# Patient Record
Sex: FEMALE | Race: WHITE | NOT HISPANIC OR LATINO
[De-identification: names, ages, dates, MRNs, and addresses within clinical notes are randomized per-mention and may not be internally consistent; named-entity substitution may affect disease eponyms.]

---

## 2019-02-04 ENCOUNTER — HOSPITAL (OUTPATIENT)
Dept: OTHER | Age: 49
End: 2019-02-04
Attending: OBSTETRICS & GYNECOLOGY

## 2021-04-26 ENCOUNTER — LAB ENCOUNTER (OUTPATIENT)
Dept: LAB | Age: 51
End: 2021-04-26
Attending: STUDENT IN AN ORGANIZED HEALTH CARE EDUCATION/TRAINING PROGRAM
Payer: COMMERCIAL

## 2021-04-26 DIAGNOSIS — Z00.00 WELL ADULT EXAM: ICD-10-CM

## 2021-04-26 DIAGNOSIS — R45.86 MOOD CHANGE: ICD-10-CM

## 2021-04-26 PROCEDURE — 86431 RHEUMATOID FACTOR QUANT: CPT

## 2021-04-26 PROCEDURE — 82306 VITAMIN D 25 HYDROXY: CPT

## 2021-04-26 PROCEDURE — 82728 ASSAY OF FERRITIN: CPT

## 2021-04-26 PROCEDURE — 86038 ANTINUCLEAR ANTIBODIES: CPT

## 2021-04-26 PROCEDURE — 83540 ASSAY OF IRON: CPT

## 2021-04-26 PROCEDURE — 84466 ASSAY OF TRANSFERRIN: CPT

## 2021-04-26 PROCEDURE — 85025 COMPLETE CBC W/AUTO DIFF WBC: CPT

## 2021-04-26 PROCEDURE — 36415 COLL VENOUS BLD VENIPUNCTURE: CPT

## 2021-04-26 PROCEDURE — 80061 LIPID PANEL: CPT

## 2021-04-26 PROCEDURE — 85652 RBC SED RATE AUTOMATED: CPT

## 2021-04-26 PROCEDURE — 84443 ASSAY THYROID STIM HORMONE: CPT

## 2021-04-26 PROCEDURE — 81001 URINALYSIS AUTO W/SCOPE: CPT | Performed by: STUDENT IN AN ORGANIZED HEALTH CARE EDUCATION/TRAINING PROGRAM

## 2021-04-26 PROCEDURE — 83036 HEMOGLOBIN GLYCOSYLATED A1C: CPT

## 2021-04-26 PROCEDURE — 80053 COMPREHEN METABOLIC PANEL: CPT

## 2021-04-26 PROCEDURE — 83721 ASSAY OF BLOOD LIPOPROTEIN: CPT

## 2021-04-26 PROCEDURE — 84207 ASSAY OF VITAMIN B-6: CPT | Performed by: STUDENT IN AN ORGANIZED HEALTH CARE EDUCATION/TRAINING PROGRAM

## 2021-04-26 PROCEDURE — 82607 VITAMIN B-12: CPT

## 2021-04-26 NOTE — PROGRESS NOTES
HPI:    Patient ID: Rehan Burdick is a 44year old female. HPI  Pt presenting to [facility] care. She states she has not felt well over the last few years since being really sick 3 years ago following an airline flight.  She complains of fatigue, mood changes SHORTNESS OF BREATH, SWELLING  Shellfish Allergy       ANAPHYLAXIS, SWELLING    04/26/21  1135   BP: 108/68   Pulse: 69   Resp: 18   SpO2: 95%   Weight: 162 lb (73.5 kg)   Height: 5' 3\" (1.6 m)       Body mass index is 28.7 kg/m².    PHYSICAL EXAM:   Physi ARTHRITIS FACTOR; Future  - SED RATE, SARAH (AUTOMATED); Future  - VITAMIN B6  - VITAMIN B12; Future    2. Fatigue, unspecified type  See above    3. Food allergy  - to see Allergy for formal allergy testing  - ALLERGY - INTERNAL    4.  Well adult exam

## 2021-04-28 ENCOUNTER — TELEPHONE (OUTPATIENT)
Dept: FAMILY MEDICINE CLINIC | Facility: CLINIC | Age: 51
End: 2021-04-28

## 2021-04-28 NOTE — TELEPHONE ENCOUNTER
Patient has viewed 4/26/2021 results in [address] and has questions. Patient made aware Dr. Deyanira Chavez not in office until [facility] testing still in process.     \"I can wait for the tests to be final, but I would prefer, when the tests are final, for h

## 2021-04-30 ENCOUNTER — TELEPHONE (OUTPATIENT)
Dept: FAMILY MEDICINE CLINIC | Facility: CLINIC | Age: 51
End: 2021-04-30

## 2021-04-30 NOTE — TELEPHONE ENCOUNTER
Patient confirms has reviewed lab results, reviewed medications that were sent to pharmacy, patient agreed with plan. Patient wanted to confirm recommendation if ok for her to move forward with getting a Covid vaccine, please advise.

## 2021-05-01 PROBLEM — E55.9 VITAMIN D DEFICIENCY: Status: ACTIVE | Noted: 2021-05-01

## 2021-05-01 PROBLEM — R16.0 ENLARGED LIVER: Status: ACTIVE | Noted: 2019-11-08

## 2021-05-01 PROBLEM — I95.9 HYPOTENSION: Status: ACTIVE | Noted: 2019-11-03

## 2021-05-01 PROBLEM — D25.9 LEIOMYOMA OF UTERUS, UNSPECIFIED: Status: ACTIVE | Noted: 2021-05-01

## 2021-05-01 PROBLEM — E04.9 ENLARGED THYROID: Status: ACTIVE | Noted: 2019-11-03

## 2021-05-01 PROBLEM — E22.0 ACROMEGALY (HCC): Status: ACTIVE | Noted: 2019-11-14

## 2021-05-01 NOTE — TELEPHONE ENCOUNTER
I recommend that any patient who has the option to get the COVID vaccine to proceed with immunization for protection against COVID infection and possible long-haul chronic symptoms.  Benefit of vaccine greatly outweighs rare risk of side effect, so she marianne

## 2021-05-03 NOTE — TELEPHONE ENCOUNTER
Relayed DR. Moore's recommendations  Patient verbalized understanding and agreed to plan of care  Patient was given phone number to schedule COVID vaccine

## 2021-05-07 ENCOUNTER — OFFICE VISIT (OUTPATIENT)
Dept: FAMILY MEDICINE CLINIC | Facility: CLINIC | Age: 51
End: 2021-05-07
Payer: COMMERCIAL

## 2021-05-07 ENCOUNTER — TELEPHONE (OUTPATIENT)
Dept: INTERNAL MEDICINE CLINIC | Facility: CLINIC | Age: 51
End: 2021-05-07

## 2021-05-07 VITALS
RESPIRATION RATE: 16 BRPM | HEIGHT: 63 IN | SYSTOLIC BLOOD PRESSURE: 120 MMHG | DIASTOLIC BLOOD PRESSURE: 68 MMHG | BODY MASS INDEX: 28.35 KG/M2 | WEIGHT: 160 LBS | HEART RATE: 74 BPM | TEMPERATURE: 99 F

## 2021-05-07 DIAGNOSIS — R68.83 CHILLS WITHOUT FEVER: Primary | ICD-10-CM

## 2021-05-07 DIAGNOSIS — R53.83 FATIGUE, UNSPECIFIED TYPE: ICD-10-CM

## 2021-05-07 DIAGNOSIS — R68.83 CHILLS (WITHOUT FEVER): Primary | ICD-10-CM

## 2021-05-07 PROCEDURE — 99213 OFFICE O/P EST LOW 20 MIN: CPT | Performed by: PHYSICIAN ASSISTANT

## 2021-05-07 PROCEDURE — 81003 URINALYSIS AUTO W/O SCOPE: CPT | Performed by: PHYSICIAN ASSISTANT

## 2021-05-07 PROCEDURE — 3008F BODY MASS INDEX DOCD: CPT | Performed by: PHYSICIAN ASSISTANT

## 2021-05-07 PROCEDURE — 3078F DIAST BP <80 MM HG: CPT | Performed by: PHYSICIAN ASSISTANT

## 2021-05-07 PROCEDURE — 3074F SYST BP LT 130 MM HG: CPT | Performed by: PHYSICIAN ASSISTANT

## 2021-05-07 PROCEDURE — 87086 URINE CULTURE/COLONY COUNT: CPT | Performed by: PHYSICIAN ASSISTANT

## 2021-05-07 NOTE — PROGRESS NOTES
CHIEF COMPLAINT:   Patient presents with:  Covid-19 Test: chills/fatigue since 5/6      HPI:   Meek Staley is a 44year old female who presents with symptoms mildly concerning for COVID19-- chills without fever and fatigue.  She was scheduled for 1st dose of well, well nourished, in no apparent distress  SKIN: no rashes,no suspicious lesions  HEAD: atraumatic, normocephalic.    EYES: conjunctiva clear  EARS: TM's non injected, no bulging, noretraction,no fluid, bony landmarks visible  NOSE: Nostrils patent, no self quarantine at home while awaiting result. CDC quarantine recommendations reviewed. Advised a negative COVID test does not guarantee pt is not infectious or contagious. Notify employer/school of testing.      Advised to follow up with PCP, noti somehow got respiratory droplets on you    Reducing the length of quarantine may make it easier for people to quarantine by reducing the time they cannot work.  A shorter quarantine period also can lessen stress on the public health system, especially when your hands often with soap and water for at least 20 seconds or clean your hands with an alcohol-based hand  that contains at least 60% alcohol. 8. As much as possible, stay in a specific room and away from other people in your home.  Also, you s have a fever with cough or shortness of breath but have not been exposed to someone with COVID-19 and have not tested positive for COVID-19, you should also stay home and away from others for a total of 10 days after your symptoms started, and at least 24 convalescent plasma donors must:    · Have had a confirmed diagnosis of COVID-19  · Be symptom-free for at least 14 days*    *Some people will be required to have a repeat COVID-19 test in order to be eligible to donate.  If you’re instructed by Leona nuñez factors. How do I prevent PASC?   The best way to prevent the long-term symptoms of COVID-19 is by getting the COVID 19 vaccine as soon as it is available to you, wear a mask in public, avoid large gatherings, wash your hands frequently, and stay at least

## 2021-05-07 NOTE — TELEPHONE ENCOUNTER
Pt states she was scheduled for covid vaccine today and informed them she has chills and fatigue. Per pt \"they told me I should be tested for covid\"    Pt denies any further symptoms such as cough, fever, SOB, diarrhea, nausea, wheezing, weakness.

## 2021-05-07 NOTE — PATIENT INSTRUCTIONS
Coronavirus Disease 2019 (COVID-19)     [facility] is committed to the safety and well-being of our patients, members, employees, and communities.  As concerns arise about the new strain of coronavirus that causes COVID-19, [employer] exposure  • After day 7 from date of last exposure with a negative test result (test must occur on day 5 or later)  After stopping quarantine, you should  • Watch for symptoms until 14 days after exposure.   • If you have symptoms, immediately self-isolate Care     If you are awaiting test results or are confirmed positive for COVID -19, and your symptoms worsen at home with symptoms such as: extreme weakness, difficult breathing, or unrelenting fevers greater than 100.4 degrees Fahrenheit, you should contac Follow-up  If you are diagnosed with COVID, refrain from exercise until approved by your primary care provider. Please call your primary care provider within 2 days of your discharge to arrange for a telehealth follow-up.  CDC does not recommend repeat test Control & Prevention (CDC)  10 things you can do to manage your health at home, Marvel.nl. pdf  Tour Desk.[email or web address].au Coronavirus \"long-hauler\". (2021, January 28). Retrieved March 17, 2021, from [email or web address]-it-means-ey-dm-d-coronavirus-long-hauler/

## 2021-05-19 ENCOUNTER — IMMUNIZATION (OUTPATIENT)
Dept: LAB | Facility: HOSPITAL | Age: 51
End: 2021-05-19
Attending: EMERGENCY MEDICINE
Payer: COMMERCIAL

## 2021-05-19 DIAGNOSIS — Z23 NEED FOR VACCINATION: Primary | ICD-10-CM

## 2021-05-19 PROCEDURE — 0001A SARSCOV2 VAC 30MCG/0.3ML IM: CPT

## 2021-06-01 ENCOUNTER — TELEPHONE (OUTPATIENT)
Dept: FAMILY MEDICINE CLINIC | Facility: CLINIC | Age: 51
End: 2021-06-01

## 2021-06-01 NOTE — TELEPHONE ENCOUNTER
Patient was seen in office on 4/26/2021 to establish care. Per patient at that visit she and Dr. Isela Duque discussed possibly starting patient on a mood stabilizer/ depression medication. Per patient, she would like to do so.  She is asking that  se

## 2021-06-02 NOTE — TELEPHONE ENCOUNTER
Verified pt name and . Reviewed doctor's message as noted below. Pt states she will keep in-office appt as scheduled 21.

## 2021-06-02 NOTE — TELEPHONE ENCOUNTER
Would recommend virtual visit to discuss current symptoms and treatment options. She does not need to come into the office, but at least virtual visit please.

## 2021-06-04 ENCOUNTER — LAB ENCOUNTER (OUTPATIENT)
Dept: LAB | Age: 51
End: 2021-06-04
Attending: STUDENT IN AN ORGANIZED HEALTH CARE EDUCATION/TRAINING PROGRAM
Payer: COMMERCIAL

## 2021-06-04 ENCOUNTER — OFFICE VISIT (OUTPATIENT)
Dept: FAMILY MEDICINE CLINIC | Facility: CLINIC | Age: 51
End: 2021-06-04
Payer: COMMERCIAL

## 2021-06-04 VITALS
HEIGHT: 63 IN | HEART RATE: 79 BPM | WEIGHT: 160 LBS | DIASTOLIC BLOOD PRESSURE: 67 MMHG | TEMPERATURE: 98 F | SYSTOLIC BLOOD PRESSURE: 109 MMHG | BODY MASS INDEX: 28.35 KG/M2

## 2021-06-04 DIAGNOSIS — R31.9 HEMATURIA, UNSPECIFIED TYPE: ICD-10-CM

## 2021-06-04 DIAGNOSIS — Z91.018 FOOD ALLERGY: ICD-10-CM

## 2021-06-04 DIAGNOSIS — R10.11 COLICKY RUQ ABDOMINAL PAIN: ICD-10-CM

## 2021-06-04 DIAGNOSIS — R35.0 URINARY FREQUENCY: ICD-10-CM

## 2021-06-04 DIAGNOSIS — R19.4 CHANGE IN STOOL HABITS: ICD-10-CM

## 2021-06-04 DIAGNOSIS — R43.2 LOSS OF TASTE: ICD-10-CM

## 2021-06-04 DIAGNOSIS — R43.2 LOSS OF TASTE: Primary | ICD-10-CM

## 2021-06-04 PROCEDURE — 3078F DIAST BP <80 MM HG: CPT | Performed by: STUDENT IN AN ORGANIZED HEALTH CARE EDUCATION/TRAINING PROGRAM

## 2021-06-04 PROCEDURE — 36415 COLL VENOUS BLD VENIPUNCTURE: CPT

## 2021-06-04 PROCEDURE — 99214 OFFICE O/P EST MOD 30 MIN: CPT | Performed by: STUDENT IN AN ORGANIZED HEALTH CARE EDUCATION/TRAINING PROGRAM

## 2021-06-04 PROCEDURE — 86003 ALLG SPEC IGE CRUDE XTRC EA: CPT

## 2021-06-04 PROCEDURE — 3074F SYST BP LT 130 MM HG: CPT | Performed by: STUDENT IN AN ORGANIZED HEALTH CARE EDUCATION/TRAINING PROGRAM

## 2021-06-04 PROCEDURE — 86769 SARS-COV-2 COVID-19 ANTIBODY: CPT

## 2021-06-04 PROCEDURE — 81002 URINALYSIS NONAUTO W/O SCOPE: CPT | Performed by: STUDENT IN AN ORGANIZED HEALTH CARE EDUCATION/TRAINING PROGRAM

## 2021-06-04 PROCEDURE — 3008F BODY MASS INDEX DOCD: CPT | Performed by: STUDENT IN AN ORGANIZED HEALTH CARE EDUCATION/TRAINING PROGRAM

## 2021-06-04 PROCEDURE — 82785 ASSAY OF IGE: CPT

## 2021-06-04 NOTE — PROGRESS NOTES
HPI:    Patient ID: Sanya Rogers is a 46year old female. HPI  Pt presenting for follow-up. She states that something has changes since she does not feel right. She reports change in taste associated with mouth irritation on inner lower lip.  She reports de (1.6 m)       Body mass index is 28.34 kg/m². PHYSICAL EXAM:   Physical Exam  Vitals reviewed. Constitutional:       General: She is not in acute distress. Appearance: Normal appearance. She is well-developed.    HENT:      Head: Normocephalic and a hydration  - GASTRO - INTERNAL  - US ABDOMEN COMPLETE (CPT=76700); Future    4. Colicky RUQ abdominal pain  - US ABDOMEN COMPLETE (CPT=76700); Future    5.  Urinary frequency  POC UA blood, glucose  - will check urine micro and UCx  - increase hydration and

## 2021-06-05 ENCOUNTER — TELEPHONE (OUTPATIENT)
Dept: FAMILY MEDICINE CLINIC | Facility: CLINIC | Age: 51
End: 2021-06-05

## 2021-06-05 NOTE — TELEPHONE ENCOUNTER
Dr Margaret Overton lab unable to add on labs. Does pt need to be fasting for redraw? Or need to add anything else?

## 2021-06-05 NOTE — TELEPHONE ENCOUNTER
----- Message from Tita Quick MD sent at 6/5/2021  3:02 AM CDT -----  Please call lab about add-on CPK, CMP orders.

## 2021-06-07 ENCOUNTER — LAB ENCOUNTER (OUTPATIENT)
Dept: LAB | Age: 51
End: 2021-06-07
Attending: STUDENT IN AN ORGANIZED HEALTH CARE EDUCATION/TRAINING PROGRAM
Payer: COMMERCIAL

## 2021-06-07 DIAGNOSIS — R31.9 HEMATURIA, UNSPECIFIED TYPE: ICD-10-CM

## 2021-06-07 PROCEDURE — 82550 ASSAY OF CK (CPK): CPT

## 2021-06-07 PROCEDURE — 36415 COLL VENOUS BLD VENIPUNCTURE: CPT

## 2021-06-07 PROCEDURE — 80053 COMPREHEN METABOLIC PANEL: CPT

## 2021-06-07 NOTE — TELEPHONE ENCOUNTER
Called pt to inform that she needs to come back for another blood draw. Pt verbalized understanding.

## 2021-06-09 ENCOUNTER — TELEPHONE (OUTPATIENT)
Dept: FAMILY MEDICINE CLINIC | Facility: CLINIC | Age: 51
End: 2021-06-09

## 2021-06-09 NOTE — TELEPHONE ENCOUNTER
Spoke with patient ( verified) and relayed Dr. Rudi Whyte messages below--patient verbalizes understanding and agreement.      Patient asking for assistance with sooner appt than 2021 with Dr. Ezra Alfaro, if possible--sent to Allergy Clinical staff for as

## 2021-06-09 NOTE — TELEPHONE ENCOUNTER
allergy appointment  Message [phone]  From   Shirley Ly RN To   Sathish Dejesus and Delivered   6/9/2021  9:28 AM   Last Read in [email or web address]   6/9/2021 11:00 AM by Adriana Chandra,     I just left you a message to relay the Allergy nurse, Juan R Gill

## 2021-06-09 NOTE — TELEPHONE ENCOUNTER
Hi,   I am so sorry but we do not have any consult that is sooner than what the patient already has scheduled. We are booking into the third week of July at this time. This is our busiest time of the year, and we only have one provider in office.  She has b

## 2021-06-28 ENCOUNTER — NURSE ONLY (OUTPATIENT)
Dept: ALLERGY | Facility: CLINIC | Age: 51
End: 2021-06-28
Payer: COMMERCIAL

## 2021-06-28 ENCOUNTER — OFFICE VISIT (OUTPATIENT)
Dept: ALLERGY | Facility: CLINIC | Age: 51
End: 2021-06-28
Payer: COMMERCIAL

## 2021-06-28 VITALS
RESPIRATION RATE: 18 BRPM | SYSTOLIC BLOOD PRESSURE: 114 MMHG | OXYGEN SATURATION: 95 % | WEIGHT: 160 LBS | BODY MASS INDEX: 28.35 KG/M2 | HEIGHT: 63 IN | DIASTOLIC BLOOD PRESSURE: 74 MMHG | HEART RATE: 83 BPM

## 2021-06-28 DIAGNOSIS — J30.89 NON-SEASONAL ALLERGIC RHINITIS DUE TO OTHER ALLERGIC TRIGGER: ICD-10-CM

## 2021-06-28 DIAGNOSIS — Z91.018 FOOD ALLERGY: Primary | ICD-10-CM

## 2021-06-28 DIAGNOSIS — F17.200 SMOKER: ICD-10-CM

## 2021-06-28 DIAGNOSIS — Z91.018 FOOD ALLERGY: ICD-10-CM

## 2021-06-28 DIAGNOSIS — Z88.0 PENICILLIN ALLERGY: ICD-10-CM

## 2021-06-28 PROCEDURE — 3078F DIAST BP <80 MM HG: CPT | Performed by: ALLERGY & IMMUNOLOGY

## 2021-06-28 PROCEDURE — 95004 PERQ TESTS W/ALRGNC XTRCS: CPT | Performed by: ALLERGY & IMMUNOLOGY

## 2021-06-28 PROCEDURE — 3074F SYST BP LT 130 MM HG: CPT | Performed by: ALLERGY & IMMUNOLOGY

## 2021-06-28 PROCEDURE — 99204 OFFICE O/P NEW MOD 45 MIN: CPT | Performed by: ALLERGY & IMMUNOLOGY

## 2021-06-28 PROCEDURE — 3008F BODY MASS INDEX DOCD: CPT | Performed by: ALLERGY & IMMUNOLOGY

## 2021-06-28 RX ORDER — CHLORAL HYDRATE 500 MG
1000 CAPSULE ORAL DAILY
COMMUNITY

## 2021-06-28 NOTE — PATIENT INSTRUCTIONS
1. Food allergy   patient reports a known history of shellfish allergy. Patient defers retesting at this time and move avoidance. Patient defers EpiPen at this time  Patient concern for other potential food triggers.   See skin testing to common food linh

## 2021-06-28 NOTE — PROGRESS NOTES
Raven Tomlin is a 46year old female. HPI:   Patient presents with: Allergies: Consult Miguel Schafer MD.  Pt presents with shellfish allergy, states she has not be feeling well. Having symtoms of mild rash, diarreha. Denies antihistamine use. Outpatient Medications   Medication Sig Dispense Refill   • omega-3 fatty acids 1000 MG Oral Cap Take 1,000 mg by mouth daily. • ergocalciferol 1.25 MG (31739 UT) Oral Cap Take 1 capsule (50,000 Units total) by mouth once a week.  24 capsule 0   • metFO adenopathy  Lymphatic: no abnormal cervical, supraclavicular or axillary adenopathy is noted  Respiratory: normal to inspection lungs are clear to auscultation bilaterally normal respiratory effort   Cardiovascular: regular rate and rhythm no murmurs, gall testing to penicillin if interested in pursuing    4. Reviewed smoking cessation         Orders This Visit:  No orders of the defined types were placed in this encounter.       Meds This Visit:  Requested Prescriptions      No prescriptions requested or or

## 2021-07-01 ENCOUNTER — HOSPITAL ENCOUNTER (OUTPATIENT)
Dept: ULTRASOUND IMAGING | Age: 51
Discharge: HOME | End: 2021-07-01
Attending: STUDENT IN AN ORGANIZED HEALTH CARE EDUCATION/TRAINING PROGRAM
Payer: COMMERCIAL

## 2021-07-01 DIAGNOSIS — R10.11 COLICKY RUQ ABDOMINAL PAIN: ICD-10-CM

## 2021-07-01 DIAGNOSIS — R19.4 CHANGE IN STOOL HABITS: ICD-10-CM

## 2021-07-06 ENCOUNTER — HOSPITAL ENCOUNTER (OUTPATIENT)
Dept: ULTRASOUND IMAGING | Facility: HOSPITAL | Age: 51
Discharge: HOME | End: 2021-07-06
Attending: STUDENT IN AN ORGANIZED HEALTH CARE EDUCATION/TRAINING PROGRAM
Payer: COMMERCIAL

## 2021-07-06 PROCEDURE — 76700 US EXAM ABDOM COMPLETE: CPT | Performed by: STUDENT IN AN ORGANIZED HEALTH CARE EDUCATION/TRAINING PROGRAM

## 2021-08-07 ENCOUNTER — HOSPITAL ENCOUNTER (EMERGENCY)
Facility: HOSPITAL | Age: 51
Discharge: HOME | End: 2021-08-07
Attending: EMERGENCY MEDICINE
Payer: COMMERCIAL

## 2021-08-07 ENCOUNTER — APPOINTMENT (OUTPATIENT)
Dept: CT IMAGING | Facility: HOSPITAL | Age: 51
End: 2021-08-07
Attending: EMERGENCY MEDICINE
Payer: COMMERCIAL

## 2021-08-07 ENCOUNTER — APPOINTMENT (OUTPATIENT)
Dept: GENERAL RADIOLOGY | Facility: HOSPITAL | Age: 51
End: 2021-08-07
Attending: EMERGENCY MEDICINE
Payer: COMMERCIAL

## 2021-08-07 VITALS
RESPIRATION RATE: 20 BRPM | BODY MASS INDEX: 27.64 KG/M2 | SYSTOLIC BLOOD PRESSURE: 121 MMHG | OXYGEN SATURATION: 95 % | HEIGHT: 63 IN | HEART RATE: 61 BPM | DIASTOLIC BLOOD PRESSURE: 70 MMHG | WEIGHT: 156 LBS | TEMPERATURE: 98 F

## 2021-08-07 DIAGNOSIS — R91.1 LUNG NODULE: ICD-10-CM

## 2021-08-07 DIAGNOSIS — K57.30 DIVERTICULOSIS OF COLON: ICD-10-CM

## 2021-08-07 DIAGNOSIS — R53.1 WEAKNESS GENERALIZED: ICD-10-CM

## 2021-08-07 DIAGNOSIS — R53.83 OTHER FATIGUE: Primary | ICD-10-CM

## 2021-08-07 LAB
ALBUMIN SERPL-MCNC: 3.8 G/DL (ref 3.4–5)
ALP LIVER SERPL-CCNC: 68 U/L
ALT SERPL-CCNC: 56 U/L
ANION GAP SERPL CALC-SCNC: 8 MMOL/L (ref 0–18)
AST SERPL-CCNC: 20 U/L (ref 15–37)
BASOPHILS # BLD AUTO: 0.02 X10(3) UL (ref 0–0.2)
BASOPHILS NFR BLD AUTO: 0.3 %
BILIRUB DIRECT SERPL-MCNC: <0.1 MG/DL (ref 0–0.2)
BILIRUB SERPL-MCNC: 0.3 MG/DL (ref 0.1–2)
BUN BLD-MCNC: 13 MG/DL (ref 7–18)
BUN/CREAT SERPL: 23.2 (ref 10–20)
CALCIUM BLD-MCNC: 9.3 MG/DL (ref 8.5–10.1)
CHLORIDE SERPL-SCNC: 112 MMOL/L (ref 98–112)
CO2 SERPL-SCNC: 22 MMOL/L (ref 21–32)
CREAT BLD-MCNC: 0.56 MG/DL
DEPRECATED RDW RBC AUTO: 42 FL (ref 35.1–46.3)
EOSINOPHIL # BLD AUTO: 0.06 X10(3) UL (ref 0–0.7)
EOSINOPHIL NFR BLD AUTO: 0.9 %
ERYTHROCYTE [DISTWIDTH] IN BLOOD BY AUTOMATED COUNT: 12.6 % (ref 11–15)
GLUCOSE BLD-MCNC: 113 MG/DL (ref 70–99)
HCT VFR BLD AUTO: 40.1 %
HGB BLD-MCNC: 13.5 G/DL
IMM GRANULOCYTES # BLD AUTO: 0.02 X10(3) UL (ref 0–1)
IMM GRANULOCYTES NFR BLD: 0.3 %
LIPASE SERPL-CCNC: 114 U/L (ref 73–393)
LYMPHOCYTES # BLD AUTO: 2.15 X10(3) UL (ref 1–4)
LYMPHOCYTES NFR BLD AUTO: 33.2 %
M PROTEIN MFR SERPL ELPH: 7.1 G/DL (ref 6.4–8.2)
MCH RBC QN AUTO: 30.3 PG (ref 26–34)
MCHC RBC AUTO-ENTMCNC: 33.7 G/DL (ref 31–37)
MCV RBC AUTO: 90.1 FL
MONOCYTES # BLD AUTO: 0.5 X10(3) UL (ref 0.1–1)
MONOCYTES NFR BLD AUTO: 7.7 %
NEUTROPHILS # BLD AUTO: 3.72 X10 (3) UL (ref 1.5–7.7)
NEUTROPHILS # BLD AUTO: 3.72 X10(3) UL (ref 1.5–7.7)
NEUTROPHILS NFR BLD AUTO: 57.6 %
NT-PROBNP SERPL-MCNC: 81 PG/ML (ref ?–125)
OSMOLALITY SERPL CALC.SUM OF ELEC: 295 MOSM/KG (ref 275–295)
PLATELET # BLD AUTO: 218 10(3)UL (ref 150–450)
POTASSIUM SERPL-SCNC: 3.7 MMOL/L (ref 3.5–5.1)
RBC # BLD AUTO: 4.45 X10(6)UL
SODIUM SERPL-SCNC: 142 MMOL/L (ref 136–145)
TROPONIN I SERPL-MCNC: <0.045 NG/ML (ref ?–0.04)
WBC # BLD AUTO: 6.5 X10(3) UL (ref 4–11)

## 2021-08-07 PROCEDURE — 93010 ELECTROCARDIOGRAM REPORT: CPT | Performed by: EMERGENCY MEDICINE

## 2021-08-07 PROCEDURE — 71045 X-RAY EXAM CHEST 1 VIEW: CPT | Performed by: EMERGENCY MEDICINE

## 2021-08-07 PROCEDURE — 99284 EMERGENCY DEPT VISIT MOD MDM: CPT

## 2021-08-07 PROCEDURE — 80076 HEPATIC FUNCTION PANEL: CPT | Performed by: EMERGENCY MEDICINE

## 2021-08-07 PROCEDURE — 36415 COLL VENOUS BLD VENIPUNCTURE: CPT

## 2021-08-07 PROCEDURE — 74177 CT ABD & PELVIS W/CONTRAST: CPT | Performed by: EMERGENCY MEDICINE

## 2021-08-07 PROCEDURE — 84484 ASSAY OF TROPONIN QUANT: CPT | Performed by: EMERGENCY MEDICINE

## 2021-08-07 PROCEDURE — 83690 ASSAY OF LIPASE: CPT | Performed by: EMERGENCY MEDICINE

## 2021-08-07 PROCEDURE — 83880 ASSAY OF NATRIURETIC PEPTIDE: CPT | Performed by: EMERGENCY MEDICINE

## 2021-08-07 PROCEDURE — 80048 BASIC METABOLIC PNL TOTAL CA: CPT | Performed by: EMERGENCY MEDICINE

## 2021-08-07 PROCEDURE — 93005 ELECTROCARDIOGRAM TRACING: CPT

## 2021-08-07 PROCEDURE — 85025 COMPLETE CBC W/AUTO DIFF WBC: CPT | Performed by: EMERGENCY MEDICINE

## 2021-08-07 RX ORDER — DICYCLOMINE HCL 20 MG
20 TABLET ORAL 4 TIMES DAILY PRN
Qty: 20 TABLET | Refills: 0 | Status: SHIPPED | OUTPATIENT
Start: 2021-08-07

## 2021-08-07 NOTE — ED INITIAL ASSESSMENT (HPI)
Pt reports she saw her MD a few months ago due to just not feeling well and still feels unwell. Pt has not been taking her medications due to feeling unwell. Pt now has diarrhea that started this morning.  Pt reports generalized weakness for a few weeks, we

## 2021-08-07 NOTE — ED QUICK NOTES
Rounding Completed    Plan of Care reviewed. Waiting for Discharge  Elimination needs assessed. Bed is locked and in lowest position. Call light within reach.

## 2021-08-07 NOTE — ED PROVIDER NOTES
Patient Seen in: [facility] AND [facility] Emergency Department      History   No chief complaint on file. Stated Complaint: Diarrhea and weakness    HPI/Subjective:   HPI    58-year-old female presents for evaluation of generalized weakness and fatigue.   Chuy Morris Head: Normocephalic and atraumatic. Eyes:      Conjunctiva/sclera: Conjunctivae normal.   Cardiovascular:      Rate and Rhythm: Normal rate and regular rhythm. Heart sounds: Normal heart sounds.    Pulmonary:      Effort: Pulmonary effort is normal. Imaging Results Available and Reviewed while in ED: CT ABDOMEN PELVIS IV CONTRAST, NO ORAL (ER)   Final Result    PROCEDURE: CT ABDOMEN PELVIS IV CONTRAST NO ORAL (ER)         COMPARISON: [facility], [facility]. [facility], US ABDOMEN COMPLETE     (CPT= suggest diverticulitis. Bowel loops are     normal in caliber without distended loops to suggest obstructive process. A normal caliber appendix is     identified in the right lower quadrant. Subhepatic cecum is noted.   This     is still predominantly PORTABLE  (CPT=71045)    TIME: 1731 hours         COMPARISON: None. INDICATIONS: Diarrhea today. Weakness for a week. TECHNIQUE:   Single view.            FINDINGS:     CARDIAC/VASC: Normal.  No cardiac silhouette abnormality or cardiomegaly Complicating Factors: The patient already has does not have any pertinent problems on file. to contribute to the complexity of this ED evaluation.     Monitor Interpretation: normal sinus rhythm with a rate of Normal    Oxygen Saturation: 95% on room ai

## 2021-08-07 NOTE — ED QUICK NOTES
Rounding Completed    Plan of Care reviewed. Waiting for xray results   Elimination needs assessed. Provided education . Bed is locked and in lowest position. Call light within reach.

## 2021-08-07 NOTE — ED QUICK NOTES
Patient admitted with diarrhea x 4 starting this am. No N/V or fever reported. Patient has not been formally diagnosed with any GI complications.

## 2021-09-02 NOTE — H&P
[address] - Gastroenterology                                                                                                               Reason for consult: change in bowel habits    Requesting physician or p just under 8 mm.  Recommend follow-up chest CT in 6-12 months for stability, especially if patient has history   of smoking.       5. Lesser incidental findings as above.                 Dictated by (CST): Humza Taylor MD on 8/07/2021 at 6:46 PM       Saw FreeStyle Lancets Does not apply Misc daily. • PEG 3350-KCl-Na Bicarb-NaCl (TRILYTE) 420 g Oral Recon Soln Take prep as directed by gastro office. May substitute with Trilyte/generic equivalent if needed.  4000 mL 0   • dicyclomine 20 MG Oral Tab Take 1 scars, normal bowel sounds, soft, non-tender, non-distended no rebound or guarding, no masses, no hepatomegaly  MSK: No redness, no warmth, no swelling of joints  SKIN: No jaundice, no erythema, no rashes  HEMATOLOGIC: No bleeding, no bruising  NEURO: Margier 7.7    Eosinophil %   % 0.9    Basophil %   % 0.3    Immature Granulocyte %   % 0.3          . ASSESSMENT/PLAN:   Johana Bach is a 46year old year-old female with history of allergic rhinitis:    #fatty liver  #hepatomegaly  Noted on CT.  HFP normal 8/7/2 this.       >>>Please note: if you were prescribed Suprep for the bowel prep and it is too expensive or not covered by insurance, it is okay to substitute Trilyte (or any similar generic prep).  This can be done by notifying the pharmacy or calling our offi

## 2021-09-07 ENCOUNTER — OFFICE VISIT (OUTPATIENT)
Dept: GASTROENTEROLOGY | Facility: CLINIC | Age: 51
End: 2021-09-07
Payer: COMMERCIAL

## 2021-09-07 ENCOUNTER — TELEPHONE (OUTPATIENT)
Dept: GASTROENTEROLOGY | Facility: CLINIC | Age: 51
End: 2021-09-07

## 2021-09-07 VITALS
WEIGHT: 153.19 LBS | HEIGHT: 63 IN | HEART RATE: 80 BPM | DIASTOLIC BLOOD PRESSURE: 65 MMHG | SYSTOLIC BLOOD PRESSURE: 104 MMHG | BODY MASS INDEX: 27.14 KG/M2 | TEMPERATURE: 99 F

## 2021-09-07 DIAGNOSIS — R11.0 NAUSEA: ICD-10-CM

## 2021-09-07 DIAGNOSIS — R16.0 HEPATOMEGALY: ICD-10-CM

## 2021-09-07 DIAGNOSIS — R12 HEART BURN: ICD-10-CM

## 2021-09-07 DIAGNOSIS — R19.4 CHANGE IN BOWEL HABITS: Primary | ICD-10-CM

## 2021-09-07 DIAGNOSIS — K76.0 FATTY LIVER: ICD-10-CM

## 2021-09-07 DIAGNOSIS — R63.4 WEIGHT LOSS: ICD-10-CM

## 2021-09-07 DIAGNOSIS — R19.4 CHANGE IN BOWEL HABITS: ICD-10-CM

## 2021-09-07 DIAGNOSIS — R14.0 BLOATING: ICD-10-CM

## 2021-09-07 DIAGNOSIS — R16.0 HEPATOMEGALY: Primary | ICD-10-CM

## 2021-09-07 DIAGNOSIS — R12 HEARTBURN: ICD-10-CM

## 2021-09-07 PROCEDURE — 3078F DIAST BP <80 MM HG: CPT | Performed by: NURSE PRACTITIONER

## 2021-09-07 PROCEDURE — 3008F BODY MASS INDEX DOCD: CPT | Performed by: NURSE PRACTITIONER

## 2021-09-07 PROCEDURE — 3074F SYST BP LT 130 MM HG: CPT | Performed by: NURSE PRACTITIONER

## 2021-09-07 PROCEDURE — 99244 OFF/OP CNSLTJ NEW/EST MOD 40: CPT | Performed by: NURSE PRACTITIONER

## 2021-09-07 RX ORDER — POLYETHYLENE GLYCOL 3350, SODIUM CHLORIDE, SODIUM BICARBONATE, POTASSIUM CHLORIDE 420; 11.2; 5.72; 1.48 G/4L; G/4L; G/4L; G/4L
POWDER, FOR SOLUTION ORAL
Qty: 4000 ML | Refills: 0 | Status: SHIPPED | OUTPATIENT
Start: 2021-09-07

## 2021-09-07 RX ORDER — LANCETS 28 GAUGE
EACH MISCELLANEOUS DAILY
COMMUNITY
Start: 2021-08-02

## 2021-09-07 RX ORDER — BLOOD-GLUCOSE METER
1 KIT MISCELLANEOUS DAILY
COMMUNITY
Start: 2021-08-02

## 2021-09-07 RX ORDER — BLOOD-GLUCOSE METER
1 KIT MISCELLANEOUS AS DIRECTED
COMMUNITY
Start: 2021-08-02

## 2021-09-07 RX ORDER — GLIMEPIRIDE 1 MG/1
1 TABLET ORAL
COMMUNITY
Start: 2021-07-30

## 2021-09-07 NOTE — PATIENT INSTRUCTIONS
-small portions  -low-fat diet  -pepcid 20 mg as needed  -start fibercon or citrucel once daily  -labs  1. Schedule colonoscopy/egd with MICKY w/  [Diagnosis:change in bowel habits, heartburn, bloating, nausea, weight loss ]    2.   bowel prep fro

## 2021-09-07 NOTE — TELEPHONE ENCOUNTER
Scheduled for:  Colonoscopy 76043 EGD [id number]  Provider Name:  Dr Teri Ventura  Date:  10/7/21  Location:  [address] [place] [address]  Sedation:  MAC  Time:  12:30pm (pt is aware to arrive at 11:30am)  Prep:  trilyte  Meds/Allergies Reconciled?:    Diagnosis with codes:  Change in bowel

## 2021-10-01 ENCOUNTER — TELEPHONE (OUTPATIENT)
Dept: GASTROENTEROLOGY | Facility: CLINIC | Age: 51
End: 2021-10-01

## 2021-10-01 NOTE — TELEPHONE ENCOUNTER
LMTCB to reschedule procedure. Please transfer to [facility] in GI     Endo PAT/RN called patient and she stated that she wanted to r/s her procedure.

## 2021-10-11 NOTE — TELEPHONE ENCOUNTER
I spoke with this patient to reschedule her procedure but she decided to wait and will CB one she is ready to reschedule.

## 2021-12-11 ENCOUNTER — NURSE TRIAGE (OUTPATIENT)
Dept: SCHEDULING | Age: 51
End: 2021-12-11

## 2022-01-13 ENCOUNTER — TELEPHONE (OUTPATIENT)
Dept: GASTROENTEROLOGY | Facility: CLINIC | Age: 52
End: 2022-01-13

## 2022-01-13 NOTE — TELEPHONE ENCOUNTER
1st reminder letter sent out via mail and [employer] for the following:   TSH W REFLEX TO FREE T4 (Order #[id number]) on 9/7/21

## 2022-01-20 NOTE — TELEPHONE ENCOUNTER
Patient will call to reschedule when she gets her diabetes in control spoke to patient on 01/20/2022   SK

## 2022-11-23 ENCOUNTER — OFFICE VISIT (OUTPATIENT)
Dept: ENDOCRINOLOGY CLINIC | Facility: CLINIC | Age: 52
End: 2022-11-23
Payer: COMMERCIAL

## 2022-11-23 VITALS
DIASTOLIC BLOOD PRESSURE: 89 MMHG | WEIGHT: 165 LBS | HEART RATE: 95 BPM | BODY MASS INDEX: 29 KG/M2 | SYSTOLIC BLOOD PRESSURE: 147 MMHG

## 2022-11-23 DIAGNOSIS — R73.09 OTHER ABNORMAL GLUCOSE: Primary | ICD-10-CM

## 2022-11-23 LAB
GLUCOSE BLOOD: 197
TEST STRIP LOT #: NORMAL NUMERIC

## 2022-11-23 PROCEDURE — 99215 OFFICE O/P EST HI 40 MIN: CPT

## 2022-11-23 PROCEDURE — 3077F SYST BP >= 140 MM HG: CPT

## 2022-11-23 PROCEDURE — 82947 ASSAY GLUCOSE BLOOD QUANT: CPT

## 2022-11-23 PROCEDURE — 3079F DIAST BP 80-89 MM HG: CPT

## 2023-01-03 ENCOUNTER — OFFICE VISIT (OUTPATIENT)
Dept: ENDOCRINOLOGY CLINIC | Facility: CLINIC | Age: 53
End: 2023-01-03
Payer: COMMERCIAL

## 2023-01-03 ENCOUNTER — LAB ENCOUNTER (OUTPATIENT)
Dept: LAB | Facility: HOSPITAL | Age: 53
End: 2023-01-03
Attending: INTERNAL MEDICINE
Payer: COMMERCIAL

## 2023-01-03 VITALS — WEIGHT: 162 LBS | BODY MASS INDEX: 29 KG/M2

## 2023-01-03 DIAGNOSIS — Z79.4 TYPE 2 DIABETES MELLITUS WITH HYPERGLYCEMIA, WITH LONG-TERM CURRENT USE OF INSULIN (HCC): ICD-10-CM

## 2023-01-03 DIAGNOSIS — E78.5 DYSLIPIDEMIA: ICD-10-CM

## 2023-01-03 DIAGNOSIS — E11.65 TYPE 2 DIABETES MELLITUS WITH HYPERGLYCEMIA, WITH LONG-TERM CURRENT USE OF INSULIN (HCC): ICD-10-CM

## 2023-01-03 DIAGNOSIS — E55.9 VITAMIN D DEFICIENCY: Primary | ICD-10-CM

## 2023-01-03 DIAGNOSIS — E78.1 HYPERTRIGLYCERIDEMIA: ICD-10-CM

## 2023-01-03 LAB
CARTRIDGE LOT#: ABNORMAL NUMERIC
CREAT UR-SCNC: 155 MG/DL
GLUCOSE BLOOD: 193
HEMOGLOBIN A1C: 8.3 % (ref 4.3–5.6)
LDLC SERPL DIRECT ASSAY-MCNC: 141 MG/DL (ref ?–100)
MICROALBUMIN UR-MCNC: 2.99 MG/DL
MICROALBUMIN/CREAT 24H UR-RTO: 19.3 UG/MG (ref ?–30)
TEST STRIP LOT #: NORMAL NUMERIC

## 2023-01-03 PROCEDURE — 99214 OFFICE O/P EST MOD 30 MIN: CPT | Performed by: INTERNAL MEDICINE

## 2023-01-03 PROCEDURE — 82570 ASSAY OF URINE CREATININE: CPT

## 2023-01-03 PROCEDURE — 82043 UR ALBUMIN QUANTITATIVE: CPT

## 2023-01-03 PROCEDURE — 3061F NEG MICROALBUMINURIA REV: CPT | Performed by: STUDENT IN AN ORGANIZED HEALTH CARE EDUCATION/TRAINING PROGRAM

## 2023-01-03 PROCEDURE — 82947 ASSAY GLUCOSE BLOOD QUANT: CPT | Performed by: INTERNAL MEDICINE

## 2023-01-03 PROCEDURE — 3052F HG A1C>EQUAL 8.0%<EQUAL 9.0%: CPT | Performed by: INTERNAL MEDICINE

## 2023-01-03 PROCEDURE — 83721 ASSAY OF BLOOD LIPOPROTEIN: CPT

## 2023-01-03 PROCEDURE — 36415 COLL VENOUS BLD VENIPUNCTURE: CPT

## 2023-01-03 PROCEDURE — 83036 HEMOGLOBIN GLYCOSYLATED A1C: CPT | Performed by: INTERNAL MEDICINE

## 2023-01-05 ENCOUNTER — TELEPHONE (OUTPATIENT)
Dept: ENDOCRINOLOGY CLINIC | Facility: CLINIC | Age: 53
End: 2023-01-05

## 2023-01-05 NOTE — TELEPHONE ENCOUNTER
Hi!    Please let patient know that it would be alright to go back to taking this just once a day until Dr. Hector Hodgson comes back to the office and decides if she would rather start a different medication for her. In the meantime, please ask patient to maintain good hydration and replace electrolytes with sugar free Gatorade if necessary. She should also be keeping track of blood sugars. Thank you.

## 2023-01-05 NOTE — TELEPHONE ENCOUNTER
Called patient and relayed message below. Pt agreeable to taking just once a day (as opposed to BID) until Dr. Flores Back recommends further tomorrow.

## 2023-01-05 NOTE — TELEPHONE ENCOUNTER
Hi!  Is patient taking extended release form or immediate release form? Also, from Dr. China Muller note, it looks like patient has taken the medication before. Is patient stating that she thinks the symptoms are from taking the medication twice? Please clarify. Thank you!

## 2023-01-05 NOTE — TELEPHONE ENCOUNTER
Dr. Radha Roberts (Dr. Chandana Garcia patient)    Last seen 1/3/23. Pt c/o diarrhea that started yesterday, loose stool. Claims she's had diarrhea before but yesterday was more \"prominent\". Yesterday was her first dose of metformin  mg BID (this is the only DM medication she's on). Pt denies abd pain, N/V, fever, hematochezia. Pt is able to keep food/liquid down, she's not on abx therapy. She stopped fish oil today thinking it could be contributing to her diarrhea on top of her fenofibrate and metformin ER. Yesterday BG: , during the day 240, 234, 159 HS    This morning 178 fasting    Please advise. Thank you.

## 2023-01-05 NOTE — TELEPHONE ENCOUNTER
Dr. Julio Mcghee    Patient stated she is on Metformin extended release and is taking this twice daily. Patient stated symptoms started when she took medication yesterday on BID dosing.

## 2023-01-06 ENCOUNTER — TELEPHONE (OUTPATIENT)
Dept: ENDOCRINOLOGY CLINIC | Facility: CLINIC | Age: 53
End: 2023-01-06

## 2023-01-06 RX ORDER — GLIMEPIRIDE 1 MG/1
1 TABLET ORAL
Qty: 90 TABLET | Refills: 0 | Status: SHIPPED | OUTPATIENT
Start: 2023-01-06

## 2023-01-06 NOTE — TELEPHONE ENCOUNTER
Spoke to patient regarding Dr. China Muller result notes and recommends below. Patient verbalized understanding, will contact us in one week with blood sugar update. Prescription sent for glimepiride. Side effects reviewed with patient.

## 2023-01-06 NOTE — TELEPHONE ENCOUNTER
C/w MTF er 500 mg with BF   Can start amaryl 1 mg daily with BF  Main SE is low BG, hence check consistently and eat a low carb meal  Make sure she wears sunscreen given small risk of photosensitivity  Also, please rule out h/o sulfa allergy    Call with BG in a week     Thanks

## 2023-01-06 NOTE — TELEPHONE ENCOUNTER
Noted below. Patient notified, will take 2 mg of glimepiride and 1 tablet of metformin in the mornings with breakfast, will call clinic with an update.

## 2023-01-06 NOTE — TELEPHONE ENCOUNTER
Dr. Skylar Bethea -- [facility]    Spoke to patient and was panicking because her BG is at 281 right now. So, she took another 1 mg of glimepiride (total of 2 mg today). She just ate salad and chicken sandwich around 1:30 PM.  RN educated her that spike in BG is expected after a meal but should start to go down within 2-3 hours. RN also informed her since she just took glimepiride now to give it time to work as we don't want to over correct leading to hypoglycemia given this is the s/e of medication. Advised her to stay hydrated, walk around, closely monitor her BG today and if BG goes down in the 70s or persistently over 300 to call before Monday and page you. RN also educated her that it's not just sweets that could make her sugar go up but also diff types of carbs. Pt will call on Monday for BG update.

## 2023-01-06 NOTE — TELEPHONE ENCOUNTER
Dr. Skylar Bethea    I spoke to patient regarding your previous note about adding glimepiride. I sent the prescription in however patient stated that she has some at home. Can patient take 1 tablet now? Please advise.

## 2023-01-06 NOTE — TELEPHONE ENCOUNTER
Noted  She can take a total of 2 mg daily of glimepiride in addition to one MTF   Both  Can be taken with BF   Thanks

## 2023-01-07 ENCOUNTER — TELEPHONE (OUTPATIENT)
Dept: ENDOCRINOLOGY CLINIC | Facility: CLINIC | Age: 53
End: 2023-01-07

## 2023-01-07 NOTE — TELEPHONE ENCOUNTER
Patient called with high BG of 250 PP  Reviewed carb diet  And fasting and PP goals  CPM   Update with BG next week   Thanks

## 2023-01-09 NOTE — TELEPHONE ENCOUNTER
Can take glimepiride 2 mg with and early  lunch today  Going forward:  Please take glimepiride 2 mg and MTF  mg with BF   Thanks

## 2023-01-09 NOTE — TELEPHONE ENCOUNTER
Spoke with roxane. She took 1 tab metformin ER and 1mg glimepiride today since speaking with out office BG is now 190. Advised her to start taking 2mg glimepiride and 500mg metformin ER with breakfast tomorrow.

## 2023-01-09 NOTE — TELEPHONE ENCOUNTER
Dr. Pallavi Servin to patient - she stated fasting BG today was 177 - ate eggs with zucchini - then took MTF  - did not take glimepiride - BG 2 hrs after breakfast was 210 - patient asking if she should take glimepiride now (RN advised meds should be taken with food)  Patient states BG readings were high yesterday - could not recall exact readings  Per TE dtd 1/5/23: Patient notified, will take 2 mg of glimepiride and 1 tablet of metformin in the mornings with breakfast    Please advise on medications and timing of when to take -thanks

## 2023-01-10 ENCOUNTER — TELEPHONE (OUTPATIENT)
Dept: ENDOCRINOLOGY CLINIC | Facility: CLINIC | Age: 53
End: 2023-01-10

## 2023-01-10 DIAGNOSIS — E78.1 HYPERTRIGLYCERIDEMIA: ICD-10-CM

## 2023-01-10 NOTE — TELEPHONE ENCOUNTER
Jimena Sandhu can you please call the patient when you get a chance?    We just started new medications/ regimen, we need to give this some time  Please give this atleast a week and check BG before BF and before dinner  If she checks post meals, please check at 2 hours post meal    Also, she might benefit from a visit with you to go over diet and when to check   Sugars  Please offer that also  Thanks

## 2023-01-10 NOTE — TELEPHONE ENCOUNTER
Called and spoke with pt. Current Reigmen:  Glimepiride 2mg daily (AM) (Started on 1/9)  Metformin 500mg daily    Nutrition:  Breakfast: Eggs (ie omelette)  L/D: Meat with veggies (typically not pasta)  Snack: Nuts  Beverages: Water; denies juice or pop    Pt reached out due to still having higher blood sugars. Pt is constantly checking blood sugar because she is curious of BG. It has caused anxiety and she feels it sometimes impacts her motor skills. Discussed with pt the impact of stress/anxiety on blood sugars which can cause the blood sugar to rise. Reviewed that it is great she is testing so frequently but that a lot of times, this can cause more anxiety especially with having \"dirty\" numbers. Advised to check BG before eating () or 2 hours after eating (<180). And that sometimes, it can take some time to reach these values. Advised not to check shortly after eating because BG will most likely be elevated. Reviewed MOA of Glimepiride and to give it a total of 1 week on this higher dose. If no improvement, to call on Monday for medication adjustment.     Plan:  - Continue current regimen of Glimepiride 2mg daily and Metformin 500mg daily  - Check blood sugar either before eating or 2 hours after (not shortly after a meal)  - Continue with low carb nutrition   - Can walk to help bring down blood sugar  - Pt will call on Monday if higher blood sugars still persist

## 2023-01-10 NOTE — TELEPHONE ENCOUNTER
Dr. Miryam Moulton to patient who stated that \"This isn't working\" in regard to [facility]. She stated she had an egg omelete around 9 am today, and blood sugar now is 235. Blood sugar last night before bed was 155. She took Glimepiride 2 mg this morning and Metformin  mg. She stated she is not hungry right now and is planning on eating lunch in an hour or so. Advised her to stay well hydrated. Patient is very anxious. She stated she eating very little carbs. Patient stated she cannot get her blood sugar below 150. Please advise.

## 2023-01-13 RX ORDER — ATORVASTATIN CALCIUM 10 MG/1
10 TABLET, FILM COATED ORAL NIGHTLY
Qty: 30 TABLET | Refills: 2 | Status: CANCELLED | OUTPATIENT
Start: 2023-01-13

## 2023-01-13 RX ORDER — ATORVASTATIN CALCIUM 10 MG/1
10 TABLET, FILM COATED ORAL NIGHTLY
Qty: 90 TABLET | Refills: 0 | Status: SHIPPED | OUTPATIENT
Start: 2023-01-13

## 2023-01-13 NOTE — TELEPHONE ENCOUNTER
Recommend trying atorvastatin 20 mg daily   Please review SE including muscle cramping and liver enzyme elevation   This a re more prominent in patients on fenofibrate ( to lower TG) like she is on   If she is okay, can send prescription   Thanks

## 2023-01-13 NOTE — TELEPHONE ENCOUNTER
We can start slow if she wants   Can do atorvastatin 10 mg daily     Also, fenofibrate is to lower Tg, her TG are high and hence this is recommended   Since TG > 500 can increase risk of pancreatitis  If she does not like the fenofibrate, she can go on the gemfibrozil  600 mg BID  Thanks

## 2023-01-13 NOTE — TELEPHONE ENCOUNTER
Dr. Skylar Bethea,     Please advise to prescribe Atorvastatin 10mg or 20mg. Patient stated she stopped taking Fenofibrate a week ago after seeing you. It has not been helping. Patient is feeling worse currently- weak, fatigued, trouble concentrating. Denies dizziness, SOB. Has not taking Atorvastatin.

## 2023-01-13 NOTE — TELEPHONE ENCOUNTER
Patient agreed with Atorvastatin 10mg and restarting fenofibrate (patient still has some medication left). Patient confirmed pharmacy. Patient agrees starting slow with 10mg     Rx sent per written order.      LOV: 1/3/23  RTC: 3 months

## 2023-01-30 ENCOUNTER — LAB ENCOUNTER (OUTPATIENT)
Dept: LAB | Age: 53
End: 2023-01-30
Attending: STUDENT IN AN ORGANIZED HEALTH CARE EDUCATION/TRAINING PROGRAM

## 2023-01-30 ENCOUNTER — OFFICE VISIT (OUTPATIENT)
Dept: FAMILY MEDICINE CLINIC | Facility: CLINIC | Age: 53
End: 2023-01-30

## 2023-01-30 VITALS
SYSTOLIC BLOOD PRESSURE: 118 MMHG | WEIGHT: 161 LBS | HEART RATE: 93 BPM | HEIGHT: 63 IN | BODY MASS INDEX: 28.53 KG/M2 | OXYGEN SATURATION: 92 % | DIASTOLIC BLOOD PRESSURE: 74 MMHG

## 2023-01-30 DIAGNOSIS — E78.5 DYSLIPIDEMIA: ICD-10-CM

## 2023-01-30 DIAGNOSIS — Z12.11 COLON CANCER SCREENING: ICD-10-CM

## 2023-01-30 DIAGNOSIS — Z12.4 SCREENING FOR CERVICAL CANCER: ICD-10-CM

## 2023-01-30 DIAGNOSIS — R19.8 SUPRAPUBIC FULLNESS: ICD-10-CM

## 2023-01-30 DIAGNOSIS — E78.5 HYPERLIPIDEMIA, UNSPECIFIED HYPERLIPIDEMIA TYPE: ICD-10-CM

## 2023-01-30 DIAGNOSIS — Z12.31 SCREENING MAMMOGRAM FOR BREAST CANCER: ICD-10-CM

## 2023-01-30 DIAGNOSIS — Z01.419 ENCOUNTER FOR WELL WOMAN EXAM WITH ROUTINE GYNECOLOGICAL EXAM: ICD-10-CM

## 2023-01-30 DIAGNOSIS — L98.9 SKIN LESION OF FACE: ICD-10-CM

## 2023-01-30 DIAGNOSIS — Z01.419 ENCOUNTER FOR WELL WOMAN EXAM WITH ROUTINE GYNECOLOGICAL EXAM: Primary | ICD-10-CM

## 2023-01-30 DIAGNOSIS — E55.9 VITAMIN D DEFICIENCY: ICD-10-CM

## 2023-01-30 DIAGNOSIS — Z82.49 FAMILY HISTORY OF HEART DISEASE: ICD-10-CM

## 2023-01-30 LAB
ALBUMIN SERPL-MCNC: 4 G/DL (ref 3.4–5)
ALBUMIN/GLOB SERPL: 1.2 {RATIO} (ref 1–2)
ALP LIVER SERPL-CCNC: 72 U/L
ALT SERPL-CCNC: 70 U/L
ANION GAP SERPL CALC-SCNC: 7 MMOL/L (ref 0–18)
AST SERPL-CCNC: 31 U/L (ref 15–37)
BILIRUB SERPL-MCNC: 0.3 MG/DL (ref 0.1–2)
BUN BLD-MCNC: 24 MG/DL (ref 7–18)
BUN/CREAT SERPL: 32.9 (ref 10–20)
CALCIUM BLD-MCNC: 9.2 MG/DL (ref 8.5–10.1)
CHLORIDE SERPL-SCNC: 109 MMOL/L (ref 98–112)
CHOLEST SERPL-MCNC: 238 MG/DL (ref ?–200)
CO2 SERPL-SCNC: 22 MMOL/L (ref 21–32)
CREAT BLD-MCNC: 0.73 MG/DL
DEPRECATED RDW RBC AUTO: 40.9 FL (ref 35.1–46.3)
ERYTHROCYTE [DISTWIDTH] IN BLOOD BY AUTOMATED COUNT: 12.4 % (ref 11–15)
FASTING PATIENT LIPID ANSWER: NO
FASTING STATUS PATIENT QL REPORTED: NO
GFR SERPLBLD BASED ON 1.73 SQ M-ARVRAT: 98 ML/MIN/1.73M2 (ref 60–?)
GLOBULIN PLAS-MCNC: 3.4 G/DL (ref 2.8–4.4)
GLUCOSE BLD-MCNC: 199 MG/DL (ref 70–99)
HCT VFR BLD AUTO: 42 %
HDLC SERPL-MCNC: 28 MG/DL (ref 40–59)
HGB BLD-MCNC: 14.4 G/DL
LDLC SERPL CALC-MCNC: 94 MG/DL (ref ?–100)
MCH RBC QN AUTO: 31 PG (ref 26–34)
MCHC RBC AUTO-ENTMCNC: 34.3 G/DL (ref 31–37)
MCV RBC AUTO: 90.3 FL
NONHDLC SERPL-MCNC: 210 MG/DL (ref ?–130)
OSMOLALITY SERPL CALC.SUM OF ELEC: 296 MOSM/KG (ref 275–295)
PLATELET # BLD AUTO: 282 10(3)UL (ref 150–450)
POTASSIUM SERPL-SCNC: 3.9 MMOL/L (ref 3.5–5.1)
PROT SERPL-MCNC: 7.4 G/DL (ref 6.4–8.2)
RBC # BLD AUTO: 4.65 X10(6)UL
SODIUM SERPL-SCNC: 138 MMOL/L (ref 136–145)
TRIGL SERPL-MCNC: 687 MG/DL (ref 30–149)
VLDLC SERPL CALC-MCNC: 118 MG/DL (ref 0–30)
WBC # BLD AUTO: 7.2 X10(3) UL (ref 4–11)

## 2023-01-30 PROCEDURE — 83695 ASSAY OF LIPOPROTEIN(A): CPT

## 2023-01-30 PROCEDURE — 80053 COMPREHEN METABOLIC PANEL: CPT

## 2023-01-30 PROCEDURE — 36415 COLL VENOUS BLD VENIPUNCTURE: CPT

## 2023-01-30 PROCEDURE — 80061 LIPID PANEL: CPT

## 2023-01-30 PROCEDURE — 85027 COMPLETE CBC AUTOMATED: CPT

## 2023-01-31 ENCOUNTER — TELEPHONE (OUTPATIENT)
Dept: ENDOCRINOLOGY CLINIC | Facility: CLINIC | Age: 53
End: 2023-01-31

## 2023-01-31 LAB — HPV I/H RISK 1 DNA SPEC QL NAA+PROBE: NEGATIVE

## 2023-02-01 ENCOUNTER — OFFICE VISIT (OUTPATIENT)
Dept: GASTROENTEROLOGY | Facility: CLINIC | Age: 53
End: 2023-02-01

## 2023-02-01 ENCOUNTER — TELEPHONE (OUTPATIENT)
Dept: FAMILY MEDICINE CLINIC | Facility: CLINIC | Age: 53
End: 2023-02-01

## 2023-02-01 VITALS
HEIGHT: 63 IN | BODY MASS INDEX: 28.88 KG/M2 | WEIGHT: 163 LBS | HEART RATE: 91 BPM | DIASTOLIC BLOOD PRESSURE: 69 MMHG | SYSTOLIC BLOOD PRESSURE: 113 MMHG

## 2023-02-01 DIAGNOSIS — R19.5 CHANGE IN STOOL: Primary | ICD-10-CM

## 2023-02-01 DIAGNOSIS — R19.8 IRREGULAR BOWEL HABITS: ICD-10-CM

## 2023-02-01 DIAGNOSIS — Z12.11 ENCOUNTER FOR SCREENING COLONOSCOPY: ICD-10-CM

## 2023-02-01 LAB — LIPOPROTEIN (A): 9 MG/DL

## 2023-02-01 NOTE — TELEPHONE ENCOUNTER
Spoke to patient to relay message below - patient stated she has apt with GI today and will ask them and also has apt tomorrow with derm and will stop by PCP to f/u while there    Patient stated she is familiar with Dr. Aria Bryan (her mom sees him) so she will call  Patient denied further questions at this time

## 2023-02-01 NOTE — TELEPHONE ENCOUNTER
Dr. Servando Wei to patient -she is concerned about resuming fenofibrate: thinks she may be in a-fib  Patient stated she doesn't feel well overall - exhausted and weak  -seeing GI today, derm tomorrow and has EKG on 3/14/23    F/U scheduled 2/15/23  Patient asking for name of cardiologist you would recommend - please advise -thanks

## 2023-02-01 NOTE — TELEPHONE ENCOUNTER
Please contact PCP asap and go to urgent care if she suspects she is in A Fib   Please send high priority to PCP office also     Dr. Kelvin Tanner   Thanks

## 2023-02-01 NOTE — TELEPHONE ENCOUNTER
Can book her a visit with Blanca/ me ( can add on duing my lunch after mid Feb)  to review other Rx options  Recommend she start fenofibrate ASAP to prevent development of pancreatitis    Okay to check 25 OH vit D  Thanks

## 2023-02-01 NOTE — PATIENT INSTRUCTIONS
# irregular bowels  Discussed workup   Check TSH and celiac    # Average Risk screening: patient is considered average risk for colon cancer (deniesfamily hx of colon cancer) and it is appropriate to proceed with screening colonoscopy. Patient is currently asymptomatic and denies diarrhea, hematochezia, thin-stools or weight loss. We discussed risks/benefits/alternatives to procedure, including CT colonography and stool testing, they want to proceed with colonoscopy. # history of bloating and nausea improved  Very stressed  Improved with diet      Recommend:  -Schedule colonoscopy w/ MAC for screening if decide not to do cologuard or if positive  -Prep: Split dose Colyte or equivalent  -Diabetes meds: Hold metformin day of procedure and day before procedure.  If patient is on other diabetic medications/insulin please ask primary or endocrine to manage pre-procedure and day of procedure    ** If MAC @ [facility]:    - NO alcohol, recreational drugs nor erectile dysfunction mediations 24 hours before procedure(s)   - NO herbal supplements or weight loss medications x 7 days prior to the procedure(s)    ** [facility] continue all medications as prescribed

## 2023-02-01 NOTE — TELEPHONE ENCOUNTER
TG are higher than before and over 500  Like reviewed before this can increase risk of pancreatitis  Has she been taking fenofibrate ? BG is 199 on blood work, was she fasting? How have her BG been ?  Before Bf and before dinner  Thanks

## 2023-02-01 NOTE — TELEPHONE ENCOUNTER
Dr. Nicole Diana with patient. Patient stated she was taking fenofibrate every other day and then stopped, she also stated she stopped the Metformin  mg--she stated this has been affecting her memory and coordination. She stated she also stopped the Glimepiride she stated she felt that it did not do anything for her. She is currently not any diabetic or cholesterol medications. She stated her BG have been fluctuating, she stated this morning her BG this morning fasting was 177. Yesterday before dinner BG was 137. She stated she has been making some dietary changes. She stated she is more concerned about her \"overall picture of health\" and is seeking help from primary. She wants to know if she should go back and get her Vitamin D level done? She did not have this drawn--patient stated she was not feeling well.

## 2023-02-09 ENCOUNTER — NURSE TRIAGE (OUTPATIENT)
Dept: FAMILY MEDICINE CLINIC | Facility: CLINIC | Age: 53
End: 2023-02-09

## 2023-02-14 ENCOUNTER — APPOINTMENT (OUTPATIENT)
Dept: CT IMAGING | Facility: HOSPITAL | Age: 53
End: 2023-02-14
Attending: EMERGENCY MEDICINE
Payer: COMMERCIAL

## 2023-02-14 ENCOUNTER — HOSPITAL ENCOUNTER (EMERGENCY)
Facility: HOSPITAL | Age: 53
Discharge: HOME | End: 2023-02-14
Attending: EMERGENCY MEDICINE
Payer: COMMERCIAL

## 2023-02-14 VITALS
OXYGEN SATURATION: 93 % | DIASTOLIC BLOOD PRESSURE: 75 MMHG | TEMPERATURE: 98 F | RESPIRATION RATE: 18 BRPM | BODY MASS INDEX: 29 KG/M2 | HEART RATE: 70 BPM | WEIGHT: 163.13 LBS | SYSTOLIC BLOOD PRESSURE: 120 MMHG

## 2023-02-14 DIAGNOSIS — R10.9 ABDOMINAL PAIN, UNSPECIFIED ABDOMINAL LOCATION: Primary | ICD-10-CM

## 2023-02-14 LAB
ALBUMIN SERPL-MCNC: 4 G/DL (ref 3.4–5)
ALBUMIN/GLOB SERPL: 1.2 {RATIO} (ref 1–2)
ALP LIVER SERPL-CCNC: 71 U/L
ALT SERPL-CCNC: 59 U/L
ANION GAP SERPL CALC-SCNC: 8 MMOL/L (ref 0–18)
AST SERPL-CCNC: 26 U/L (ref 15–37)
BASOPHILS # BLD AUTO: 0.03 X10(3) UL (ref 0–0.2)
BASOPHILS NFR BLD AUTO: 0.4 %
BILIRUB SERPL-MCNC: 0.2 MG/DL (ref 0.1–2)
BILIRUB UR QL: NEGATIVE
BUN BLD-MCNC: 13 MG/DL (ref 7–18)
BUN/CREAT SERPL: 21 (ref 10–20)
CALCIUM BLD-MCNC: 9.3 MG/DL (ref 8.5–10.1)
CHLORIDE SERPL-SCNC: 109 MMOL/L (ref 98–112)
CLARITY UR: CLEAR
CO2 SERPL-SCNC: 24 MMOL/L (ref 21–32)
COLOR UR: YELLOW
CREAT BLD-MCNC: 0.62 MG/DL
DEPRECATED RDW RBC AUTO: 41 FL (ref 35.1–46.3)
EOSINOPHIL # BLD AUTO: 0.1 X10(3) UL (ref 0–0.7)
EOSINOPHIL NFR BLD AUTO: 1.4 %
ERYTHROCYTE [DISTWIDTH] IN BLOOD BY AUTOMATED COUNT: 12.4 % (ref 11–15)
GFR SERPLBLD BASED ON 1.73 SQ M-ARVRAT: 106 ML/MIN/1.73M2 (ref 60–?)
GLOBULIN PLAS-MCNC: 3.4 G/DL (ref 2.8–4.4)
GLUCOSE BLD-MCNC: 136 MG/DL (ref 70–99)
GLUCOSE UR-MCNC: 50 MG/DL
HCT VFR BLD AUTO: 41.4 %
HGB BLD-MCNC: 14.3 G/DL
IMM GRANULOCYTES # BLD AUTO: 0.03 X10(3) UL (ref 0–1)
IMM GRANULOCYTES NFR BLD: 0.4 %
LIPASE SERPL-CCNC: 153 U/L (ref 73–393)
LIPASE SERPL-CCNC: 34 U/L (ref 13–75)
LYMPHOCYTES # BLD AUTO: 2.51 X10(3) UL (ref 1–4)
LYMPHOCYTES NFR BLD AUTO: 35.9 %
MCH RBC QN AUTO: 31.1 PG (ref 26–34)
MCHC RBC AUTO-ENTMCNC: 34.5 G/DL (ref 31–37)
MCV RBC AUTO: 90 FL
MONOCYTES # BLD AUTO: 0.43 X10(3) UL (ref 0.1–1)
MONOCYTES NFR BLD AUTO: 6.1 %
NEUTROPHILS # BLD AUTO: 3.9 X10 (3) UL (ref 1.5–7.7)
NEUTROPHILS # BLD AUTO: 3.9 X10(3) UL (ref 1.5–7.7)
NEUTROPHILS NFR BLD AUTO: 55.8 %
NITRITE UR QL STRIP.AUTO: NEGATIVE
OSMOLALITY SERPL CALC.SUM OF ELEC: 294 MOSM/KG (ref 275–295)
PH UR: 5 [PH] (ref 5–8)
PLATELET # BLD AUTO: 247 10(3)UL (ref 150–450)
POTASSIUM SERPL-SCNC: 3.6 MMOL/L (ref 3.5–5.1)
PROT SERPL-MCNC: 7.4 G/DL (ref 6.4–8.2)
PROT UR-MCNC: NEGATIVE MG/DL
RBC # BLD AUTO: 4.6 X10(6)UL
SODIUM SERPL-SCNC: 141 MMOL/L (ref 136–145)
SP GR UR STRIP: 1.02 (ref 1–1.03)
UROBILINOGEN UR STRIP-ACNC: <2
VIT C UR-MCNC: NEGATIVE MG/DL
WBC # BLD AUTO: 7 X10(3) UL (ref 4–11)

## 2023-02-14 PROCEDURE — 36415 COLL VENOUS BLD VENIPUNCTURE: CPT

## 2023-02-14 PROCEDURE — 74177 CT ABD & PELVIS W/CONTRAST: CPT | Performed by: EMERGENCY MEDICINE

## 2023-02-14 PROCEDURE — 83690 ASSAY OF LIPASE: CPT | Performed by: EMERGENCY MEDICINE

## 2023-02-14 PROCEDURE — 81001 URINALYSIS AUTO W/SCOPE: CPT | Performed by: EMERGENCY MEDICINE

## 2023-02-14 PROCEDURE — 99284 EMERGENCY DEPT VISIT MOD MDM: CPT

## 2023-02-14 PROCEDURE — 85025 COMPLETE CBC W/AUTO DIFF WBC: CPT | Performed by: EMERGENCY MEDICINE

## 2023-02-14 PROCEDURE — 80053 COMPREHEN METABOLIC PANEL: CPT | Performed by: EMERGENCY MEDICINE

## 2023-02-14 PROCEDURE — 87086 URINE CULTURE/COLONY COUNT: CPT | Performed by: EMERGENCY MEDICINE

## 2023-02-14 PROCEDURE — 80053 COMPREHEN METABOLIC PANEL: CPT

## 2023-02-14 PROCEDURE — 85025 COMPLETE CBC W/AUTO DIFF WBC: CPT

## 2023-02-15 ENCOUNTER — TELEPHONE (OUTPATIENT)
Dept: FAMILY MEDICINE CLINIC | Facility: CLINIC | Age: 53
End: 2023-02-15

## 2023-02-15 ENCOUNTER — OFFICE VISIT (OUTPATIENT)
Dept: ENDOCRINOLOGY CLINIC | Facility: CLINIC | Age: 53
End: 2023-02-15

## 2023-02-15 VITALS — WEIGHT: 162.38 LBS | BODY MASS INDEX: 29 KG/M2

## 2023-02-15 DIAGNOSIS — E78.5 DYSLIPIDEMIA: ICD-10-CM

## 2023-02-15 DIAGNOSIS — Z79.4 TYPE 2 DIABETES MELLITUS WITH HYPERGLYCEMIA, WITH LONG-TERM CURRENT USE OF INSULIN (HCC): Primary | ICD-10-CM

## 2023-02-15 DIAGNOSIS — E11.65 TYPE 2 DIABETES MELLITUS WITH HYPERGLYCEMIA, WITH LONG-TERM CURRENT USE OF INSULIN (HCC): Primary | ICD-10-CM

## 2023-02-15 LAB
GLUCOSE BLOOD: 200
TEST STRIP LOT #: NORMAL NUMERIC

## 2023-02-15 PROCEDURE — 99214 OFFICE O/P EST MOD 30 MIN: CPT | Performed by: INTERNAL MEDICINE

## 2023-02-15 PROCEDURE — 82947 ASSAY GLUCOSE BLOOD QUANT: CPT | Performed by: INTERNAL MEDICINE

## 2023-02-15 RX ORDER — INSULIN DETEMIR 100 [IU]/ML
5 INJECTION, SOLUTION SUBCUTANEOUS NIGHTLY
Qty: 6 ML | Refills: 0 | Status: SHIPPED | OUTPATIENT
Start: 2023-02-15 | End: 2023-02-15

## 2023-02-15 NOTE — TELEPHONE ENCOUNTER
Patient states she was in ER yesterday. She had a CT of her abdomen. She just changed insurance and would like to know if she should keep or cancel the following tests:     1. US Pelvis  2. CT Calcium Score  3. Echo  4.  Mammogram       ER f/u made for 2/22/23    Future Appointments   Date Time Provider Ingrid Marcos   2/17/2023 10:30 AM [facility]   2/22/2023  1:15 PM Óscar Shah MD [facility]   2/27/2023  3:20 PM [facility] NICOLAS [facility] SYSTEM [facility] CARE SYSTEM [facility]   2/27/2023  4:00  [facility] [address] [place] [facility] SYSTEM [facility]   3/14/2023  4:00  [facility] CARD RM3 ECHO [address] [facility] NI [address]   3/15/2023  5:00 PM [facility] OF [facility] CT 1 [facility] CT SCAN EM [facility]   3/29/2023 10:00 AM Frederick Mcdonald,  [address] [place] [address]

## 2023-02-15 NOTE — DISCHARGE INSTRUCTIONS
Plenty of fluids. Take Tylenol as needed for pain. Follow-up with your primary physician. Return to the emergency department if increasing pain, repeated vomiting, or other new symptoms develop.

## 2023-02-16 NOTE — TELEPHONE ENCOUNTER
Patient having trouble controlling her blood sugars, please call at [phone],VALERIEYP. Spoke with mom. Mom inquiring about obtaining scopolamine patches for an upcoming cruise.   Appointment scheduled for med check and to discuss patches. Mom verbalized understanding and appreciation.

## 2023-02-17 ENCOUNTER — OFFICE VISIT (OUTPATIENT)
Dept: DERMATOLOGY CLINIC | Facility: CLINIC | Age: 53
End: 2023-02-17

## 2023-02-17 DIAGNOSIS — D18.01 HEMANGIOMA OF SKIN: ICD-10-CM

## 2023-02-17 DIAGNOSIS — Z12.83 SCREENING EXAM FOR SKIN CANCER: Primary | ICD-10-CM

## 2023-02-17 DIAGNOSIS — D22.9 MULTIPLE NEVI: ICD-10-CM

## 2023-02-17 DIAGNOSIS — L82.1 SEBORRHEIC KERATOSIS: ICD-10-CM

## 2023-02-17 PROCEDURE — 99203 OFFICE O/P NEW LOW 30 MIN: CPT | Performed by: PHYSICIAN ASSISTANT

## 2023-02-17 RX ORDER — ACYCLOVIR 50 MG/G
OINTMENT TOPICAL
COMMUNITY
Start: 2023-02-15

## 2023-02-17 RX ORDER — INSULIN DETEMIR 100 [IU]/ML
INJECTION, SOLUTION SUBCUTANEOUS
COMMUNITY
Start: 2023-02-15

## 2023-02-18 NOTE — TELEPHONE ENCOUNTER
These orders were placed Jan 2023, so would still recommend testing. Pt can contact insurance to confirm coverage prior to completion.

## 2023-02-22 ENCOUNTER — LAB ENCOUNTER (OUTPATIENT)
Dept: LAB | Age: 53
End: 2023-02-22
Attending: INTERNAL MEDICINE
Payer: COMMERCIAL

## 2023-02-22 DIAGNOSIS — R53.83 OTHER FATIGUE: ICD-10-CM

## 2023-02-22 DIAGNOSIS — R19.8 IRREGULAR BOWEL HABITS: ICD-10-CM

## 2023-02-22 LAB
ESTRADIOL SERPL-MCNC: 14 PG/ML
FSH SERPL-ACNC: 75 MIU/ML
IGA SERPL-MCNC: 108 MG/DL (ref 70–312)
LH SERPL-ACNC: 22.9 MIU/ML
PROGEST SERPL-MCNC: 0.58 NG/ML
PROLACTIN SERPL-MCNC: 6 NG/ML
VIT D+METAB SERPL-MCNC: 22.3 NG/ML (ref 30–100)

## 2023-02-22 PROCEDURE — 82784 ASSAY IGA/IGD/IGG/IGM EACH: CPT

## 2023-02-22 PROCEDURE — 36415 COLL VENOUS BLD VENIPUNCTURE: CPT

## 2023-02-22 PROCEDURE — 86364 TISS TRNSGLTMNASE EA IG CLAS: CPT

## 2023-02-22 PROCEDURE — 84402 ASSAY OF FREE TESTOSTERONE: CPT

## 2023-02-22 PROCEDURE — 84403 ASSAY OF TOTAL TESTOSTERONE: CPT

## 2023-02-23 ENCOUNTER — LAB ENCOUNTER (OUTPATIENT)
Dept: LAB | Age: 53
End: 2023-02-23
Attending: STUDENT IN AN ORGANIZED HEALTH CARE EDUCATION/TRAINING PROGRAM
Payer: COMMERCIAL

## 2023-02-23 ENCOUNTER — TELEPHONE (OUTPATIENT)
Dept: FAMILY MEDICINE CLINIC | Facility: CLINIC | Age: 53
End: 2023-02-23

## 2023-02-23 DIAGNOSIS — E23.7 PITUITARY ABNORMALITY (HCC): ICD-10-CM

## 2023-02-23 DIAGNOSIS — R53.83 OTHER FATIGUE: Primary | ICD-10-CM

## 2023-02-23 PROCEDURE — 82024 ASSAY OF ACTH: CPT

## 2023-02-23 PROCEDURE — 36415 COLL VENOUS BLD VENIPUNCTURE: CPT

## 2023-02-23 NOTE — TELEPHONE ENCOUNTER
Patient called and was wondering if the testosterone was able to be processed, she was made aware that the testosterone is in process as well as the ACTH. Patient verbalized understanding. No further questions or concerns at this time.

## 2023-02-23 NOTE — TELEPHONE ENCOUNTER
Unable to add on testosterone to previous labs. There is not enough sample. Dr. Natacha Herr do you still wish to order this test?  Will need new order and new blood draw. Pended for signature.

## 2023-02-24 LAB — ADRENOCORTICOTROPIC HORMONE: 21.1 PG/ML

## 2023-02-26 LAB
SEX HORMONE BINDING GLOBULIN: 18 NMOL/L
TESTOSTERONE -MS, BIOAVAILAB: 4.3 NG/DL
TESTOSTERONE, -MS/MS: 7 NG/DL
TESTOSTERONE, FREE -MS/MS: 1.5 PG/ML

## 2023-02-27 ENCOUNTER — TELEPHONE (OUTPATIENT)
Dept: FAMILY MEDICINE CLINIC | Facility: CLINIC | Age: 53
End: 2023-02-27

## 2023-02-27 ENCOUNTER — HOSPITAL ENCOUNTER (OUTPATIENT)
Dept: MAMMOGRAPHY | Facility: HOSPITAL | Age: 53
Discharge: HOME | End: 2023-02-27
Attending: STUDENT IN AN ORGANIZED HEALTH CARE EDUCATION/TRAINING PROGRAM
Payer: COMMERCIAL

## 2023-02-27 ENCOUNTER — HOSPITAL ENCOUNTER (OUTPATIENT)
Dept: ULTRASOUND IMAGING | Facility: HOSPITAL | Age: 53
Discharge: HOME | End: 2023-02-27
Attending: STUDENT IN AN ORGANIZED HEALTH CARE EDUCATION/TRAINING PROGRAM
Payer: COMMERCIAL

## 2023-02-27 DIAGNOSIS — R19.8 SUPRAPUBIC FULLNESS: ICD-10-CM

## 2023-02-27 DIAGNOSIS — Z12.31 SCREENING MAMMOGRAM FOR BREAST CANCER: ICD-10-CM

## 2023-02-27 DIAGNOSIS — R00.2 PALPITATIONS: Primary | ICD-10-CM

## 2023-02-27 PROCEDURE — 76856 US EXAM PELVIC COMPLETE: CPT | Performed by: STUDENT IN AN ORGANIZED HEALTH CARE EDUCATION/TRAINING PROGRAM

## 2023-02-27 PROCEDURE — 76830 TRANSVAGINAL US NON-OB: CPT | Performed by: STUDENT IN AN ORGANIZED HEALTH CARE EDUCATION/TRAINING PROGRAM

## 2023-02-27 PROCEDURE — 77067 SCR MAMMO BI INCL CAD: CPT | Performed by: STUDENT IN AN ORGANIZED HEALTH CARE EDUCATION/TRAINING PROGRAM

## 2023-02-27 PROCEDURE — 77063 BREAST TOMOSYNTHESIS BI: CPT | Performed by: STUDENT IN AN ORGANIZED HEALTH CARE EDUCATION/TRAINING PROGRAM

## 2023-02-27 NOTE — TELEPHONE ENCOUNTER
Patient calling (identified name and ) with an updated after LOV on . Over the weekend, patient states her heart rate has been fluctuating, going up to the 110's, feels weak, and sleepy. Unsure if it is anxiety or cardiac related. Baseline HR 70-80. Patient states she has also started taking Metformin ER taking 1000 mg daily on her own for the past 2 doses (yesterday and today) since her blood sugar has been high:    Current blood sugar readin  7:30 am today: 170   8:13pm 135   4:30 pm 118   1:16 pm 210   9:30am 158   9:03 pm 149   5:36 pm 231   1:22 pm 275   7:04 am 177    Patient asking if she should return for an appointment or be seen by cardiology. Also asking if she should go back down on the Metformin to 500 mg daily. Please advise.

## 2023-02-27 NOTE — TELEPHONE ENCOUNTER
Ok to continue 1000mg daily. Will order Holter to further evaluate HR. If feeling unsafe at home, recommend UC evaluation.

## 2023-02-28 ENCOUNTER — TELEPHONE (OUTPATIENT)
Dept: ENDOCRINOLOGY CLINIC | Facility: CLINIC | Age: 53
End: 2023-02-28

## 2023-02-28 NOTE — TELEPHONE ENCOUNTER
See other TE dtd 2/28/23 - if patient is to continue on MTF ER 500mg (not glucophage) - refill requested

## 2023-02-28 NOTE — TELEPHONE ENCOUNTER
Called patient and relayed below message as outlined. Pt states she does not have a cardiologist but PCP is already addressing HR and scheduled for echo. Pt will update clinic with her BG as recommended.

## 2023-02-28 NOTE — TELEPHONE ENCOUNTER
Patient experiencing Diarrhea - spoke with PCP and was recommended to call Dr Tyrell Camacho as they suspect it might be the Metformin. Patient states she took Metformin this morning. Please call. Thank you.

## 2023-02-28 NOTE — TELEPHONE ENCOUNTER
Sorry to hear that she is not feeling her best  Take insulin : same dose  Take MTF ER with BF only   Check BG before Bf and before dinner  Update in one week     To call if  diarrhea does not resolve    Regarding HR: will defer to PCP and cardiology   Recommend discussing further evaluation with them     [facility] she feels better soon     Thanks

## 2023-03-01 ENCOUNTER — TELEPHONE (OUTPATIENT)
Dept: FAMILY MEDICINE CLINIC | Facility: CLINIC | Age: 53
End: 2023-03-01

## 2023-03-01 LAB — TTG IGA SER-ACNC: 0.3 U/ML (ref ?–7)

## 2023-03-01 NOTE — TELEPHONE ENCOUNTER
Called and notified pt of prior auth approval  Patient calling ( identified name and  ) asking about Mammogram results     Explained results have not yet edwar read and she will be notified of results when PCP reviewes the tests    Patient verbalizes understanding and agrees with plan.

## 2023-03-02 ENCOUNTER — TELEPHONE (OUTPATIENT)
Dept: ENDOCRINOLOGY CLINIC | Facility: CLINIC | Age: 53
End: 2023-03-02

## 2023-03-02 DIAGNOSIS — R92.8 ABNORMAL MAMMOGRAM OF BOTH BREASTS: Primary | ICD-10-CM

## 2023-03-02 NOTE — TELEPHONE ENCOUNTER
[address] Olga Lidia Coombs noted. 2/27 a. 170  10:30 a, 211   2p 110   4p 155    2/28 A 186   11a 162   545P 187 8p 155    3/1 A 165     1p 187   9p 175    3/2 a 195  12p 205   1:p 200      Please advise patient to increase metformin to 500mg with breakfast and 500mg with dinner     She should give us an update on her BG readings again on Monday 3/9. Thank you!

## 2023-03-02 NOTE — TELEPHONE ENCOUNTER
rn called patient , states she spoke wth someone recently about blood sugars being elevated, but wants to get help since the current plan not helping. States she is only on metformin 500 mg daily. Said she stopped levemir two days ago, since pt claims it made bulge on nose bigger, pt went to derm and was seen for a growth in her nose . States was biopsied and back with precancerous findings. Patient states she is scared since sugars are elevated. On 2/27 she took 2 metformin as am blood sugars elevated , and blood sugars went down to 110 but felt weird. ...could not explain, maybe from levels going low after medl. .. Wants to try new med or get recommendations on new meds. / or what else she can take.   Is very scared also as she is having short term memory loss and getting numerous testing done   2/27 a. 170 10:30 a, 211 , 2p 110, 4p 155  2/28 A 186  11a 162 545P 187  8p 155  3/1 A 165  1p 187  9p 175  3/2 a 195 12p 205  1:p 200

## 2023-03-02 NOTE — TELEPHONE ENCOUNTER
Pt called to speak to RN. She would like to discuss medication because her sugar is not coming down. She states that it has been close to 200 regularly. Please call.

## 2023-03-03 RX ORDER — PIOGLITAZONEHYDROCHLORIDE 15 MG/1
15 TABLET ORAL DAILY
Qty: 30 TABLET | Refills: 2 | Status: SHIPPED | OUTPATIENT
Start: 2023-03-03

## 2023-03-03 NOTE — TELEPHONE ENCOUNTER
Spoke to patient and relayed Dr. Dandre Borden message as shown below. Patient then stated she is already taking MTF 500mg for BF and dinner. Patient then stated she feels metformin is causing memory problems. Reports when she stopped taking Metformin ER she ended up in the ER. Patient then offered next available with APRN, but patient is requesting to be seen sooner than next available and preferably with MD. Patient would like to change medications. Feels she should not be taking metformin. Please advise. Thank you.

## 2023-03-03 NOTE — TELEPHONE ENCOUNTER
Spoke to patient, relayed Dr. Valentine Phipps message as shown below. Patient states she would like to start Actos. Would like script sent to pharmacy, pended for you to review and sign off if appropriate. Thank you.  Appointment offered and scheduled with APRN and added on wait list.

## 2023-03-03 NOTE — TELEPHONE ENCOUNTER
Actos 15 mg daily  signed  Please call if she has any symptoms of fluid retention like SOB .  Swelling in feet   Thanks

## 2023-03-03 NOTE — TELEPHONE ENCOUNTER
We have discussed the following options multiple times    1. Insulin  Patient reports flare of skin lesion     2. She has a h/o recurrent pancreatitis  Hence we can not use GLP agonists and SGLT 2 inhibitors or DPP4 inhibitors    3. Metformin ER- more GI side effects, brain fog    4. Glimepiride- feels like it did not work    The two other options I can offer:   1. Prandin : works like glimepiride  Taken with food  Can start low and depending on BG go up on dose as needed  0.5 mg with meals   Main SE: low Bg    2.  Actos 15 mg daily   Main SE: fluid retention --> avoided in patients with BF  Potential association with bladder cancer    She can get on the wait list with me and also make apt with APRN   Thanks

## 2023-03-03 NOTE — TELEPHONE ENCOUNTER
Spoke to patient and relayed message as shown below. Patient verbalized understanding and had no further questions at this time.

## 2023-03-03 NOTE — TELEPHONE ENCOUNTER
Noted  Agree with NINOSKA recs  She can please increase MTF to 500 mg BF and dinner  Take with food since she had some GI SE last time on higher dose    Please also book apt with NINOSKA   First available  Thanks

## 2023-03-06 ENCOUNTER — HOSPITAL ENCOUNTER (OUTPATIENT)
Dept: MAMMOGRAPHY | Facility: HOSPITAL | Age: 53
Discharge: HOME | End: 2023-03-06
Attending: STUDENT IN AN ORGANIZED HEALTH CARE EDUCATION/TRAINING PROGRAM
Payer: COMMERCIAL

## 2023-03-06 ENCOUNTER — HOSPITAL ENCOUNTER (OUTPATIENT)
Dept: ULTRASOUND IMAGING | Facility: HOSPITAL | Age: 53
Discharge: HOME | End: 2023-03-06
Attending: STUDENT IN AN ORGANIZED HEALTH CARE EDUCATION/TRAINING PROGRAM
Payer: COMMERCIAL

## 2023-03-06 DIAGNOSIS — R92.8 ABNORMAL MAMMOGRAM: ICD-10-CM

## 2023-03-06 PROCEDURE — 76642 ULTRASOUND BREAST LIMITED: CPT | Performed by: STUDENT IN AN ORGANIZED HEALTH CARE EDUCATION/TRAINING PROGRAM

## 2023-03-14 ENCOUNTER — HOSPITAL ENCOUNTER (OUTPATIENT)
Dept: CV DIAGNOSTICS | Facility: HOSPITAL | Age: 53
Discharge: HOME | End: 2023-03-14
Attending: STUDENT IN AN ORGANIZED HEALTH CARE EDUCATION/TRAINING PROGRAM
Payer: COMMERCIAL

## 2023-03-14 DIAGNOSIS — Z82.49 FAMILY HISTORY OF HEART DISEASE: ICD-10-CM

## 2023-03-14 PROCEDURE — 93306 TTE W/DOPPLER COMPLETE: CPT | Performed by: STUDENT IN AN ORGANIZED HEALTH CARE EDUCATION/TRAINING PROGRAM

## 2023-03-15 ENCOUNTER — HOSPITAL ENCOUNTER (OUTPATIENT)
Dept: CT IMAGING | Facility: HOSPITAL | Age: 53
Discharge: HOME | End: 2023-03-15
Attending: STUDENT IN AN ORGANIZED HEALTH CARE EDUCATION/TRAINING PROGRAM

## 2023-03-15 DIAGNOSIS — E78.5 HYPERLIPIDEMIA, UNSPECIFIED HYPERLIPIDEMIA TYPE: ICD-10-CM

## 2023-03-16 NOTE — PROGRESS NOTES
Date of Service 3/15/2023    Meka Machado  Date of Birth 6/5/1970    Patient Age: 45year old    PCP: Hortencia Li MD  [address]  [place] [address]    Consult Type  Type Scan/Screening: Heart Scan  Preliminary Heart Scan Score: 0         /70, Right arm, standard cuff       Body Mass Index  There is no height or weight on file to calculate BMI. Lipid Profile  Cholesterol: 238, done on 1/30/2023. HDL Cholesterol: 28, done on 1/30/2023. LDL Cholesterol: 94, done on 1/30/2023. TriGlycerides 687, done on 1/30/2023. Glucose 199, done on 1/30/23 nonfasting    Nurse Review  Risk factor information and results reviewed with Nurse: Yes    Recommended Follow Up:  Consult your physician regarding[de-identified] Final Heart Scan Report; Cholesterol Results (Fasting) or nonfasting        Recommendations for Change:  Nutrition Changes: Low Saturated Fat     Cholesterol Modification (goal of therapy depends upon your risk): Increase HDL (Healthy/Good) Normal >45 Men >55 Women;Decrease Triglycerides (Ugly) Normal <150;Decrease Total Normal <200     Exercise: Initiate Program Pt states she does not feel well, she has not been exercising. Working with Endocrinologist in regard to medication and blood sugar        Stress Management: Adopt Stress Management Techniques relaxation techniques and exercise when able. Repeat Heart Scan: 5 years if Calcium Score is 0. 0; Discuss with your Physician          [facility] Recommended Resources:  Recommended Resources: Upcoming Classes, Medical Services and Health Library www. [employer]. Pattie Garcia RN        Please Contact the Nurse Heart Line with any Questions or Concerns [phone].

## 2023-03-17 ENCOUNTER — NURSE TRIAGE (OUTPATIENT)
Dept: FAMILY MEDICINE CLINIC | Facility: CLINIC | Age: 53
End: 2023-03-17

## 2023-03-22 ENCOUNTER — OFFICE VISIT (OUTPATIENT)
Dept: FAMILY MEDICINE CLINIC | Facility: CLINIC | Age: 53
End: 2023-03-22

## 2023-03-22 VITALS
DIASTOLIC BLOOD PRESSURE: 58 MMHG | SYSTOLIC BLOOD PRESSURE: 122 MMHG | HEIGHT: 63 IN | WEIGHT: 164 LBS | OXYGEN SATURATION: 95 % | HEART RATE: 87 BPM | BODY MASS INDEX: 29.06 KG/M2

## 2023-03-22 DIAGNOSIS — Z12.11 COLON CANCER SCREENING: ICD-10-CM

## 2023-03-22 DIAGNOSIS — R53.83 FATIGUE, UNSPECIFIED TYPE: ICD-10-CM

## 2023-03-22 DIAGNOSIS — F41.8 ANXIETY ABOUT HEALTH: ICD-10-CM

## 2023-03-22 DIAGNOSIS — Z72.820 POOR SLEEP: Primary | ICD-10-CM

## 2023-03-22 PROCEDURE — 3078F DIAST BP <80 MM HG: CPT | Performed by: STUDENT IN AN ORGANIZED HEALTH CARE EDUCATION/TRAINING PROGRAM

## 2023-03-22 PROCEDURE — 3008F BODY MASS INDEX DOCD: CPT | Performed by: STUDENT IN AN ORGANIZED HEALTH CARE EDUCATION/TRAINING PROGRAM

## 2023-03-22 PROCEDURE — 99214 OFFICE O/P EST MOD 30 MIN: CPT | Performed by: STUDENT IN AN ORGANIZED HEALTH CARE EDUCATION/TRAINING PROGRAM

## 2023-03-22 PROCEDURE — 3074F SYST BP LT 130 MM HG: CPT | Performed by: STUDENT IN AN ORGANIZED HEALTH CARE EDUCATION/TRAINING PROGRAM

## 2023-03-22 RX ORDER — GLIMEPIRIDE 1 MG/1
TABLET ORAL
COMMUNITY
Start: 2023-03-16 | End: 2023-03-30

## 2023-03-29 ENCOUNTER — APPOINTMENT (OUTPATIENT)
Dept: MRI IMAGING | Facility: HOSPITAL | Age: 53
End: 2023-03-29
Attending: EMERGENCY MEDICINE
Payer: COMMERCIAL

## 2023-03-29 ENCOUNTER — HOSPITAL ENCOUNTER (OUTPATIENT)
Facility: HOSPITAL | Age: 53
Setting detail: OBSERVATION
Discharge: HOME | End: 2023-03-30
Attending: EMERGENCY MEDICINE
Payer: COMMERCIAL

## 2023-03-29 ENCOUNTER — OFFICE VISIT (OUTPATIENT)
Dept: NEUROLOGY | Facility: CLINIC | Age: 53
End: 2023-03-29
Payer: COMMERCIAL

## 2023-03-29 VITALS — HEIGHT: 63 IN | BODY MASS INDEX: 29.06 KG/M2 | WEIGHT: 164 LBS

## 2023-03-29 DIAGNOSIS — R29.810 FACIAL DROOP: ICD-10-CM

## 2023-03-29 DIAGNOSIS — R47.9 DIFFICULTY WITH SPEECH: Primary | ICD-10-CM

## 2023-03-29 DIAGNOSIS — R41.0 CONFUSION: ICD-10-CM

## 2023-03-29 DIAGNOSIS — R41.3 MEMORY LOSS: Primary | ICD-10-CM

## 2023-03-29 PROBLEM — R41.82 ALTERED MENTAL STATE: Status: ACTIVE | Noted: 2023-03-29

## 2023-03-29 LAB
ALBUMIN SERPL-MCNC: 3.8 G/DL (ref 3.4–5)
ALBUMIN SERPL-MCNC: 4 G/DL (ref 3.4–5)
ALBUMIN/GLOB SERPL: 1.2 {RATIO} (ref 1–2)
ALBUMIN/GLOB SERPL: 1.2 {RATIO} (ref 1–2)
ALP LIVER SERPL-CCNC: 68 U/L
ALP LIVER SERPL-CCNC: 74 U/L
ALT SERPL-CCNC: 55 U/L
ALT SERPL-CCNC: 59 U/L
AMPHET UR QL SCN: NEGATIVE
ANION GAP SERPL CALC-SCNC: 8 MMOL/L (ref 0–18)
ANION GAP SERPL CALC-SCNC: 9 MMOL/L (ref 0–18)
AST SERPL-CCNC: 16 U/L (ref 15–37)
AST SERPL-CCNC: 21 U/L (ref 15–37)
BASOPHILS # BLD AUTO: 0.04 X10(3) UL (ref 0–0.2)
BASOPHILS NFR BLD AUTO: 0.5 %
BENZODIAZ UR QL SCN: NEGATIVE
BILIRUB SERPL-MCNC: 0.2 MG/DL (ref 0.1–2)
BILIRUB SERPL-MCNC: 0.3 MG/DL (ref 0.1–2)
BILIRUB UR QL: NEGATIVE
BUN BLD-MCNC: 14 MG/DL (ref 7–18)
BUN BLD-MCNC: 19 MG/DL (ref 7–18)
BUN/CREAT SERPL: 23 (ref 10–20)
BUN/CREAT SERPL: 23.8 (ref 10–20)
CALCIUM BLD-MCNC: 9 MG/DL (ref 8.5–10.1)
CALCIUM BLD-MCNC: 9.3 MG/DL (ref 8.5–10.1)
CANNABINOIDS UR QL SCN: NEGATIVE
CHLORIDE SERPL-SCNC: 104 MMOL/L (ref 98–112)
CHLORIDE SERPL-SCNC: 108 MMOL/L (ref 98–112)
CLARITY UR: CLEAR
CO2 SERPL-SCNC: 23 MMOL/L (ref 21–32)
CO2 SERPL-SCNC: 25 MMOL/L (ref 21–32)
COCAINE UR QL: NEGATIVE
COLOR UR: COLORLESS
CORTIS SERPL-MCNC: 12.3 UG/DL
CREAT BLD-MCNC: 0.61 MG/DL
CREAT BLD-MCNC: 0.8 MG/DL
CREAT UR-SCNC: 25.2 MG/DL
DEPRECATED RDW RBC AUTO: 41.1 FL (ref 35.1–46.3)
EOSINOPHIL # BLD AUTO: 0.06 X10(3) UL (ref 0–0.7)
EOSINOPHIL NFR BLD AUTO: 0.8 %
ERYTHROCYTE [DISTWIDTH] IN BLOOD BY AUTOMATED COUNT: 12.4 % (ref 11–15)
ERYTHROCYTE [SEDIMENTATION RATE] IN BLOOD: 9 MM/HR
ETHANOL SERPL-MCNC: <3 MG/DL (ref ?–3)
GFR SERPLBLD BASED ON 1.73 SQ M-ARVRAT: 106 ML/MIN/1.73M2 (ref 60–?)
GFR SERPLBLD BASED ON 1.73 SQ M-ARVRAT: 88 ML/MIN/1.73M2 (ref 60–?)
GLOBULIN PLAS-MCNC: 3.3 G/DL (ref 2.8–4.4)
GLOBULIN PLAS-MCNC: 3.4 G/DL (ref 2.8–4.4)
GLUCOSE BLD-MCNC: 155 MG/DL (ref 70–99)
GLUCOSE BLD-MCNC: 393 MG/DL (ref 70–99)
GLUCOSE BLDC GLUCOMTR-MCNC: 129 MG/DL (ref 70–99)
GLUCOSE BLDC GLUCOMTR-MCNC: 212 MG/DL (ref 70–99)
GLUCOSE BLDC GLUCOMTR-MCNC: 231 MG/DL (ref 70–99)
GLUCOSE BLDC GLUCOMTR-MCNC: 383 MG/DL (ref 70–99)
GLUCOSE UR-MCNC: >1000 MG/DL
HCT VFR BLD AUTO: 41.7 %
HGB BLD-MCNC: 14.1 G/DL
IMM GRANULOCYTES # BLD AUTO: 0.02 X10(3) UL (ref 0–1)
IMM GRANULOCYTES NFR BLD: 0.3 %
LEUKOCYTE ESTERASE UR QL STRIP.AUTO: NEGATIVE
LYMPHOCYTES # BLD AUTO: 2.18 X10(3) UL (ref 1–4)
LYMPHOCYTES NFR BLD AUTO: 29.4 %
MCH RBC QN AUTO: 30.5 PG (ref 26–34)
MCHC RBC AUTO-ENTMCNC: 33.8 G/DL (ref 31–37)
MCV RBC AUTO: 90.3 FL
MDMA UR QL SCN: NEGATIVE
MONOCYTES # BLD AUTO: 0.51 X10(3) UL (ref 0.1–1)
MONOCYTES NFR BLD AUTO: 6.9 %
NEUTROPHILS # BLD AUTO: 4.6 X10 (3) UL (ref 1.5–7.7)
NEUTROPHILS # BLD AUTO: 4.6 X10(3) UL (ref 1.5–7.7)
NEUTROPHILS NFR BLD AUTO: 62.1 %
NITRITE UR QL STRIP.AUTO: NEGATIVE
OPIATES UR QL SCN: NEGATIVE
OSMOLALITY SERPL CALC.SUM OF ELEC: 296 MOSM/KG (ref 275–295)
OSMOLALITY SERPL CALC.SUM OF ELEC: 301 MOSM/KG (ref 275–295)
OXYCODONE UR QL SCN: NEGATIVE
PH UR: 5.5 [PH] (ref 5–8)
PLATELET # BLD AUTO: 251 10(3)UL (ref 150–450)
POTASSIUM SERPL-SCNC: 3.6 MMOL/L (ref 3.5–5.1)
POTASSIUM SERPL-SCNC: 4.1 MMOL/L (ref 3.5–5.1)
PROT SERPL-MCNC: 7.1 G/DL (ref 6.4–8.2)
PROT SERPL-MCNC: 7.4 G/DL (ref 6.4–8.2)
PROT UR-MCNC: NEGATIVE MG/DL
RBC # BLD AUTO: 4.62 X10(6)UL
SARS-COV-2 RNA RESP QL NAA+PROBE: NOT DETECTED
SODIUM SERPL-SCNC: 136 MMOL/L (ref 136–145)
SODIUM SERPL-SCNC: 141 MMOL/L (ref 136–145)
SP GR UR STRIP: 1.03 (ref 1–1.03)
T4 FREE SERPL-MCNC: 0.8 NG/DL (ref 0.8–1.7)
THYROGLOB SERPL-MCNC: <15 U/ML (ref ?–60)
TSI SER-ACNC: 1.42 MIU/ML (ref 0.36–3.74)
UROBILINOGEN UR STRIP-ACNC: NORMAL
VIT B12 SERPL-MCNC: 738 PG/ML (ref 193–986)
WBC # BLD AUTO: 7.4 X10(3) UL (ref 4–11)

## 2023-03-29 PROCEDURE — 99222 1ST HOSP IP/OBS MODERATE 55: CPT | Performed by: HOSPITALIST

## 2023-03-29 PROCEDURE — 95816 EEG AWAKE AND DROWSY: CPT | Performed by: OTHER

## 2023-03-29 PROCEDURE — 99223 1ST HOSP IP/OBS HIGH 75: CPT | Performed by: OTHER

## 2023-03-29 PROCEDURE — 99204 OFFICE O/P NEW MOD 45 MIN: CPT | Performed by: OTHER

## 2023-03-29 PROCEDURE — 3008F BODY MASS INDEX DOCD: CPT | Performed by: OTHER

## 2023-03-29 RX ORDER — ACETAMINOPHEN 500 MG
500 TABLET ORAL EVERY 4 HOURS PRN
Status: DISCONTINUED | OUTPATIENT
Start: 2023-03-29 | End: 2023-03-30

## 2023-03-29 RX ORDER — NICOTINE POLACRILEX 4 MG
30 LOZENGE BUCCAL
Status: DISCONTINUED | OUTPATIENT
Start: 2023-03-29 | End: 2023-03-30

## 2023-03-29 RX ORDER — PROCHLORPERAZINE EDISYLATE 5 MG/ML
5 INJECTION INTRAMUSCULAR; INTRAVENOUS EVERY 8 HOURS PRN
Status: DISCONTINUED | OUTPATIENT
Start: 2023-03-29 | End: 2023-03-30

## 2023-03-29 RX ORDER — TEMAZEPAM 15 MG/1
15 CAPSULE ORAL NIGHTLY PRN
Status: DISCONTINUED | OUTPATIENT
Start: 2023-03-29 | End: 2023-03-30

## 2023-03-29 RX ORDER — CYCLOBENZAPRINE HCL 5 MG
10 TABLET ORAL 3 TIMES DAILY PRN
Status: DISCONTINUED | OUTPATIENT
Start: 2023-03-29 | End: 2023-03-30

## 2023-03-29 RX ORDER — ONDANSETRON 2 MG/ML
4 INJECTION INTRAMUSCULAR; INTRAVENOUS EVERY 6 HOURS PRN
Status: DISCONTINUED | OUTPATIENT
Start: 2023-03-29 | End: 2023-03-30

## 2023-03-29 RX ORDER — DEXTROSE MONOHYDRATE 25 G/50ML
50 INJECTION, SOLUTION INTRAVENOUS
Status: DISCONTINUED | OUTPATIENT
Start: 2023-03-29 | End: 2023-03-30

## 2023-03-29 RX ORDER — NICOTINE POLACRILEX 4 MG
15 LOZENGE BUCCAL
Status: DISCONTINUED | OUTPATIENT
Start: 2023-03-29 | End: 2023-03-30

## 2023-03-29 RX ORDER — INSULIN ASPART 100 [IU]/ML
0.15 INJECTION, SOLUTION INTRAVENOUS; SUBCUTANEOUS ONCE
Status: COMPLETED | OUTPATIENT
Start: 2023-03-29 | End: 2023-03-29

## 2023-03-29 NOTE — ED QUICK NOTES
Pt transported to floor via cart accompanied by pt transporter. Pt left dept in NAD, VSS, A&O x 4. Pt belongings verified w/ pt & accompanying pt to floor.

## 2023-03-29 NOTE — ED QUICK NOTES
Care assumed from Brendon CM Sistemi. Pt resting on cart in low/locked position, side rails up x 2, call light w/ in reach. Dr. Carla Riojas @ bedside for eval.  Pt in NAD, VSS. Equal  strength to B/L UE/LE, smile symmetric, able to stick out tongue-midline. Pt A&O x 4, w/ flight of ideas.

## 2023-03-29 NOTE — ED INITIAL ASSESSMENT (HPI)
Patient sent from Neurologist Dr. Sommer Valverde office for concern of AMS and inability to get words out. Patient is currently A&Ox4,  reports having memory issues, insomnia and confusion x6 weeks. Patient without any facial droop or slurred speech, speaking logically and coherently. No lateralizing weakness.

## 2023-03-29 NOTE — ED QUICK NOTES
Orders for admission, patient is aware of plan and ready to go upstairs. Any questions, please call ED RN Erik Mari at extension [phone].      Patient Covid vaccination status: Partially vaccinated     COVID Test Ordered in ED: Rapid SARS-CoV-2 by PCR    COVID Suspicion at Admission: N/A    Running Infusions:  None    Mental Status/LOC at time of transport: A&O x 4    Other pertinent information:   CIWA score: N/A   NIH score:  0

## 2023-03-29 NOTE — PROCEDURES
EEG report    REFERRING PHYSICIAN: Linwood Jalloh MD    PCP and phone number:  Alexi Jonas MD  [phone]    TECHNIQUE: 21 channels of EEG, 2 channels of EOG, and 1 channel of EKG were recorded utilizing the International 10/20 System. The recording was performed in a digitized monopolar referential format and playback was reformatted into various referential and bipolar montages utilizing appropriate filter settings. Automatic seizure and spike detection programs were utilized throughout the recording. Video was not recorded during the study    CLINICAL DATA:  Patient is sent for the evaluation of possible seizures. MEDICATION:  Continuous Medications:      Scheduled Medications:  No current outpatient medications on file. PRN Medications:  acetaminophen, ondansetron, prochlorperazine, temazepam, glucose **OR** glucose **OR** glucose-vitamin C **OR** dextrose **OR** glucose **OR** glucose **OR** glucose-vitamin C, cyclobenzaprine    ACTIVATION:  Hyperventilation: Not done  Photic stimulation: Not done  Sleep: Normal sleep architecture was seen. BACKGROUND  While the patient was awake, the posterior dominant rhythm consisted of well-regulated 9-10 Hz rhythmic waveforms, symmetrically distributed over both posterior quadrants and was reactive to eye opening. EEG ABNORMALITY  None    IMPRESSION:  This is a normal EEG. No focal, lateralized, or epileptiform features are noted. Clinical correlation required.

## 2023-03-29 NOTE — H&P
[facility]    PATIENT'S NAME: Rj Rojo   ATTENDING PHYSICIAN: Farida Hernandez MD   PATIENT ACCOUNT#:   [de-identified]    LOCATION:  [address]  MEDICAL RECORD #:   [id number]       YOB: 1970  ADMISSION DATE:       03/29/2023    HISTORY AND PHYSICAL EXAMINATION    CHIEF COMPLAINT:  Altered mental status. HISTORY OF PRESENT ILLNESS:  Patient is a 78-year-old  female who was seen today by Neurology for feeling foggy and unable to concentrate. She exhibited slurred speech and speech difficulty, and she was sent to the emergency department for evaluation. During her encounter she refused to have an ambulance and she walked into the emergency room. CBC and chemistry unremarkable. Glucose 393. Otherwise, no significant findings. MRI scan of the brain is still pending. PAST MEDICAL HISTORY:  Diabetes mellitus type 2. Patient had multiple side effects to multiple medications, currently only takes metformin. She could not tolerate insulin or sulfonylureas. PAST SURGICAL HISTORY:  Diagnostic laparoscopy. MEDICATIONS:  Please see medication reconciliation list.    ALLERGIES:  No known drug allergies. FAMILY HISTORY:  Positive for diabetes mellitus type 2. SOCIAL HISTORY:  Ex-tobacco user. No current alcohol, tobacco, or drug use. Lives with her . Dependent for basic activities of daily living. REVIEW OF SYSTEMS:  Patient reports she has been feeling fatigued, foggy, out of energy. She notes no difficulty of speech. She feels very anxious at times and then depressed at times. Denies any suicidal ideation. Other 12-point review of systems negative. PHYSICAL EXAMINATION:    GENERAL:   Alert, oriented to time, place, and person. Very anxious. No acute distress. VITAL SIGNS:  Temperature 98.0, pulse 67, respiratory rate 21, blood pressure 138/84, pulse oximetry 99% on room air. HEENT:  Atraumatic. Oropharynx clear.   Moist mucous membranes. Normal hard and soft palate. Eyes:  Anicteric sclerae. NECK:  Supple. No lymphadenopathy. Trachea midline. Full range of motion. LUNGS:  Clear to auscultation bilaterally. Normal respiratory effort. HEART:  Regular rate and rhythm. S1 and S2 auscultated. No murmur. ABDOMEN:  Soft, nondistended. No tenderness. Positive bowel sounds. EXTREMITIES:  No edema, clubbing, or cyanosis. NEUROLOGIC:  Motor and sensory intact. Speech is intact. Reflexes are equal bilaterally. ASSESSMENT AND PLAN:    1. Altered mental status. Rule out organic disease. MRI scan of the brain is still pending. 2.   Severe anxiety and possible major depressive disorder. 3.   Diabetes mellitus type 2, uncontrolled. Patient will be admitted to general medical floor. Monitor Accu-Cheks. Obtain psychiatry and urology consults. Further recommendations to follow.     Dictated By Garth Vela MD  d: 03/29/2023 13:02:50  t: 03/29/2023 13:36:29  Job [id number]  SC/

## 2023-03-30 ENCOUNTER — TELEPHONE (OUTPATIENT)
Dept: NEUROLOGY | Facility: CLINIC | Age: 53
End: 2023-03-30

## 2023-03-30 VITALS
TEMPERATURE: 98 F | OXYGEN SATURATION: 93 % | HEIGHT: 63 IN | BODY MASS INDEX: 28.74 KG/M2 | DIASTOLIC BLOOD PRESSURE: 61 MMHG | RESPIRATION RATE: 16 BRPM | HEART RATE: 73 BPM | WEIGHT: 162.19 LBS | SYSTOLIC BLOOD PRESSURE: 110 MMHG

## 2023-03-30 DIAGNOSIS — R47.9 DIFFICULTY WITH SPEECH: Primary | ICD-10-CM

## 2023-03-30 DIAGNOSIS — R41.0 CONFUSION: ICD-10-CM

## 2023-03-30 DIAGNOSIS — R41.3 MEMORY LOSS: ICD-10-CM

## 2023-03-30 PROBLEM — F33.2 MAJOR DEPRESSIVE DISORDER, RECURRENT EPISODE, SEVERE (HCC): Status: ACTIVE | Noted: 2023-03-30

## 2023-03-30 PROBLEM — F41.9 ANXIETY WITH SOMATIZATION: Status: ACTIVE | Noted: 2023-03-30

## 2023-03-30 PROBLEM — F45.0 ANXIETY WITH SOMATIZATION: Status: ACTIVE | Noted: 2023-03-30

## 2023-03-30 LAB
ANION GAP SERPL CALC-SCNC: 9 MMOL/L (ref 0–18)
BASOPHILS # BLD AUTO: 0.02 X10(3) UL (ref 0–0.2)
BASOPHILS NFR BLD AUTO: 0.3 %
BUN BLD-MCNC: 17 MG/DL (ref 7–18)
BUN/CREAT SERPL: 26.6 (ref 10–20)
CALCIUM BLD-MCNC: 9.1 MG/DL (ref 8.5–10.1)
CHLORIDE SERPL-SCNC: 107 MMOL/L (ref 98–112)
CO2 SERPL-SCNC: 23 MMOL/L (ref 21–32)
CREAT BLD-MCNC: 0.64 MG/DL
DEPRECATED RDW RBC AUTO: 40.7 FL (ref 35.1–46.3)
EOSINOPHIL # BLD AUTO: 0.08 X10(3) UL (ref 0–0.7)
EOSINOPHIL NFR BLD AUTO: 1.3 %
ERYTHROCYTE [DISTWIDTH] IN BLOOD BY AUTOMATED COUNT: 12.3 % (ref 11–15)
GFR SERPLBLD BASED ON 1.73 SQ M-ARVRAT: 105 ML/MIN/1.73M2 (ref 60–?)
GLUCOSE BLD-MCNC: 181 MG/DL (ref 70–99)
GLUCOSE BLDC GLUCOMTR-MCNC: 174 MG/DL (ref 70–99)
GLUCOSE BLDC GLUCOMTR-MCNC: 213 MG/DL (ref 70–99)
GLUCOSE BLDC GLUCOMTR-MCNC: 227 MG/DL (ref 70–99)
HCT VFR BLD AUTO: 40.7 %
HGB BLD-MCNC: 13.8 G/DL
IMM GRANULOCYTES # BLD AUTO: 0.06 X10(3) UL (ref 0–1)
IMM GRANULOCYTES NFR BLD: 1 %
LYMPHOCYTES # BLD AUTO: 2.66 X10(3) UL (ref 1–4)
LYMPHOCYTES NFR BLD AUTO: 42.8 %
MCH RBC QN AUTO: 30.7 PG (ref 26–34)
MCHC RBC AUTO-ENTMCNC: 33.9 G/DL (ref 31–37)
MCV RBC AUTO: 90.4 FL
MONOCYTES # BLD AUTO: 0.46 X10(3) UL (ref 0.1–1)
MONOCYTES NFR BLD AUTO: 7.4 %
NEUTROPHILS # BLD AUTO: 2.93 X10 (3) UL (ref 1.5–7.7)
NEUTROPHILS # BLD AUTO: 2.93 X10(3) UL (ref 1.5–7.7)
NEUTROPHILS NFR BLD AUTO: 47.2 %
OSMOLALITY SERPL CALC.SUM OF ELEC: 294 MOSM/KG (ref 275–295)
PLATELET # BLD AUTO: 219 10(3)UL (ref 150–450)
POTASSIUM SERPL-SCNC: 3.9 MMOL/L (ref 3.5–5.1)
RBC # BLD AUTO: 4.5 X10(6)UL
SODIUM SERPL-SCNC: 139 MMOL/L (ref 136–145)
WBC # BLD AUTO: 6.2 X10(3) UL (ref 4–11)

## 2023-03-30 PROCEDURE — 99231 SBSQ HOSP IP/OBS SF/LOW 25: CPT | Performed by: OTHER

## 2023-03-30 PROCEDURE — 99239 HOSP IP/OBS DSCHRG MGMT >30: CPT | Performed by: HOSPITALIST

## 2023-03-30 PROCEDURE — 90792 PSYCH DIAG EVAL W/MED SRVCS: CPT | Performed by: OTHER

## 2023-03-30 RX ORDER — ESCITALOPRAM OXALATE 5 MG/1
5 TABLET ORAL NIGHTLY
Status: DISCONTINUED | OUTPATIENT
Start: 2023-03-30 | End: 2023-03-30

## 2023-03-30 RX ORDER — ESCITALOPRAM OXALATE 5 MG/1
5 TABLET ORAL NIGHTLY
Qty: 30 TABLET | Refills: 0 | Status: SHIPPED | OUTPATIENT
Start: 2023-03-30

## 2023-03-30 RX ORDER — ACETAMINOPHEN 500 MG
500 TABLET ORAL EVERY 4 HOURS PRN
Qty: 1 TABLET | Refills: 0 | Status: SHIPPED | COMMUNITY
Start: 2023-03-30

## 2023-03-30 RX ORDER — LORAZEPAM 0.5 MG/1
0.5 TABLET ORAL EVERY 6 HOURS PRN
Status: DISCONTINUED | OUTPATIENT
Start: 2023-03-30 | End: 2023-03-30

## 2023-03-30 RX ORDER — CYCLOBENZAPRINE HCL 10 MG
10 TABLET ORAL 3 TIMES DAILY PRN
Qty: 30 TABLET | Refills: 0 | Status: SHIPPED | OUTPATIENT
Start: 2023-03-30

## 2023-03-30 RX ORDER — LORAZEPAM 0.5 MG/1
0.25 TABLET ORAL EVERY 8 HOURS PRN
Qty: 12 TABLET | Refills: 0 | Status: SHIPPED | OUTPATIENT
Start: 2023-03-30

## 2023-03-30 NOTE — PLAN OF CARE
Patient vital signs stable. No c/o pain. MRI Brain to be completed. No acute changes. Problem: Patient Centered Care  Goal: Patient preferences are identified and integrated in the patient's plan of care  Description: Interventions:  - What would you like us to know as we care for you?  From home with .  - Provide timely, complete, and accurate information to patient/family  - Incorporate patient and family knowledge, values, beliefs, and cultural backgrounds into the planning and delivery of care  - Encourage patient/family to participate in care and decision-making at the level they choose  - Honor patient and family perspectives and choices  Outcome: Progressing     Problem: Diabetes/Glucose Control  Goal: Glucose maintained within prescribed range  Description: INTERVENTIONS:  - Monitor Blood Glucose as ordered  - Assess for signs and symptoms of hyperglycemia and hypoglycemia  - Administer ordered medications to maintain glucose within target range  - Assess barriers to adequate nutritional intake and initiate nutrition consult as needed  - Instruct patient on self management of diabetes  Outcome: Progressing     Problem: PAIN - ADULT  Goal: Verbalizes/displays adequate comfort level or patient's stated pain goal  Description: INTERVENTIONS:  - Encourage pt to monitor pain and request assistance  - Assess pain using appropriate pain scale  - Administer analgesics based on type and severity of pain and evaluate response  - Implement non-pharmacological measures as appropriate and evaluate response  - Consider cultural and social influences on pain and pain management  - Manage/alleviate anxiety  - Utilize distraction and/or relaxation techniques  - Monitor for opioid side effects  - Notify MD/LIP if interventions unsuccessful or patient reports new pain  - Anticipate increased pain with activity and pre-medicate as appropriate  Outcome: Progressing     Problem: NEUROLOGICAL - ADULT  Goal: Achieves stable or improved neurological status  Description: INTERVENTIONS  - Assess for and report changes in neurological status  - Initiate measures to prevent increased intracranial pressure  - Maintain blood pressure and fluid volume within ordered parameters to optimize cerebral perfusion and minimize risk of hemorrhage  - Monitor temperature, glucose, and sodium.  Initiate appropriate interventions as ordered  Outcome: Progressing

## 2023-03-30 NOTE — DISCHARGE SUMMARY
Dc summary#[id number]  > 30 min spent on [address] Discharge Diagnoses: transient memory loss    Lace+ Score: 34  59-90 High Risk  29-58 Medium Risk  0-28   Low Risk. TCM Follow-Up Recommendation:  LACE > 58:  High Risk of readmission after discharge from the hospital.tcm fu recommended

## 2023-03-30 NOTE — PROGRESS NOTES
Patient okay to be discharged per MD order, all discharge instructions discussed with pt at bedside, all questions answered, peripheral IV removed, pt to be picked up by family this evening after eating dinner, pt stable at this time.

## 2023-03-30 NOTE — BH PROGRESS NOTE
[facility] provided previously discussed resources for outpatient therapy in network with [facility] POS/MCNP insurance plan, accepting new patients as of today 3/30/2023 per [facility] website, and within 10 miles of Aziza's home in her discharge instructions to utilize with existing outpatient neurology services. Nita TAVARES LPC    Note to patient:  The [id number] makes medical notes like these available to patients in the interest of transparency. However, be advised this is a medical document. It is intended as peer to peer communication. It is written in medical language and may contain abbreviations or verbiage that are unfamiliar. It may appear blunt or direct. Medical documents are intended to carry relevant information, facts as evident, and the clinical opinion of the practitioner.

## 2023-03-30 NOTE — DISCHARGE INSTRUCTIONS
Outpatient therapy providers  [address]: [address], [facility]  Phone: [phone]    [facility]: [address] [facility], [address]  Phone: Mitch De Los Santos 23 [facility] - private practice: [address] [facility], [address]  Phone: [facility]: Malinda 179, [address]  Phone: [address] [place] Billy: uM 32 [facility], [address]  Phone: [phone]    [facility] [address] [place], [address]  Phone: [address]: [address] [place] [address]. 4[address] [facility], [address]  Phone: [phone]    [facility] Presto.: [address]  Phone: [phone]    [facility] Ingrid Jenkins  Phone: [phone]    [facility]ing [facility] [address]: [address] [facility] [address]. [address]  Phone: [phone]    Follow up with above anxiety asap return to er if suicidal thoughts  No driving or work till cleared by home md  Follow up testing as recommended by neuro  Do not drink alcohol with meds  Please use novolog sliding scale with same doses humalog, since pt does not wish to use levemir       Medication List        START taking these medications      acetaminophen 500 MG Tabs  Commonly known as: Tylenol Extra Strength     cyclobenzaprine 10 MG Tabs  Commonly known as: Flexeril  Take 1 tablet (10 mg total) by mouth 3 (three) times daily as needed for Muscle spasms. Watch drowsiness no alcohol     escitalopram 5 MG Tabs  Commonly known as: Lexapro  Take 1 tablet (5 mg total) by mouth nightly. Watch severe muscle stiffness, fever     insulin aspart 100 Units/mL Sopn  Commonly known as: NovoLOG  Inject 1-7 Units into the skin 3 (three) times daily with meals.  May use humalog and follow this sliding scale; please call dr Leon Wolfe if issues     Insulin Pen Needle 32G X 4 MM Misc  May substitute if not on insurance formulary     LORazepam 0.5 MG Tabs  Commonly known as: Ativan  Take 0.5 tablets (0.25 mg total) by mouth every 8 (eight) hours as needed for Anxiety.  Watch drowsiness no alcohol            CONTINUE taking these medications      insulin lispro 100 UNIT/ML Soln  Commonly known as: Humalog     metFORMIN 500 MG Tabs  Commonly known as: Glucophage  Take 1 tablet (500 mg total) by mouth daily with breakfast.            STOP taking these medications      acyclovir 5 % Oint  Commonly known as: Zovirax     ergocalciferol 1.25 MG (93566 UT) Caps  Commonly known as: Vitamin D2     glimepiride 1 MG Tabs  Commonly known as: Amaryl     Levemir FlexTouch 100 UNIT/ML Sopn  Generic drug: insulin detemir     pioglitazone 15 MG Tabs  Commonly known as: Actos               Where to Get Your Medications        These medications were sent to Rylee Hernandez., [place] - [address] [facility] AT [address] [facility], [phone], [phone]  [address] [address]      Phone: [phone]   cyclobenzaprine 10 MG Tabs  escitalopram 5 MG Tabs  insulin aspart 100 Units/mL Sopn  Insulin Pen Needle 32G X 4 MM Misc  LORazepam 0.5 MG Tabs

## 2023-03-30 NOTE — PLAN OF CARE
DC per MD order. Pt stable at NE. KEYANNA paperwork reviewed with KEYANNA RN; all questions answered. Problem: Patient Centered Care  Goal: Patient preferences are identified and integrated in the patient's plan of care  Description: Interventions:  - What would you like us to know as we care for you?  I live with my   - Provide timely, complete, and accurate information to patient/family  - Incorporate patient and family knowledge, values, beliefs, and cultural backgrounds into the planning and delivery of care  - Encourage patient/family to participate in care and decision-making at the level they choose  - Honor patient and family perspectives and choices  Outcome: Adequate for Discharge     Problem: Diabetes/Glucose Control  Goal: Glucose maintained within prescribed range  Description: INTERVENTIONS:  - Monitor Blood Glucose as ordered  - Assess for signs and symptoms of hyperglycemia and hypoglycemia  - Administer ordered medications to maintain glucose within target range  - Assess barriers to adequate nutritional intake and initiate nutrition consult as needed  - Instruct patient on self management of diabetes  Outcome: Adequate for Discharge       Problem: PAIN - ADULT  Goal: Verbalizes/displays adequate comfort level or patient's stated pain goal  Description: INTERVENTIONS:  - Encourage pt to monitor pain and request assistance  - Assess pain using appropriate pain scale  - Administer analgesics based on type and severity of pain and evaluate response  - Implement non-pharmacological measures as appropriate and evaluate response  - Consider cultural and social influences on pain and pain management  - Manage/alleviate anxiety  - Utilize distraction and/or relaxation techniques  - Monitor for opioid side effects  - Notify MD/LIP if interventions unsuccessful or patient reports new pain  - Anticipate increased pain with activity and pre-medicate as appropriate  Outcome: Adequate for Discharge     Problem: NEUROLOGICAL - ADULT  Goal: Achieves stable or improved neurological status  Description: INTERVENTIONS  - Assess for and report changes in neurological status  - Initiate measures to prevent increased intracranial pressure  - Maintain blood pressure and fluid volume within ordered parameters to optimize cerebral perfusion and minimize risk of hemorrhage  - Monitor temperature, glucose, and sodium.  Initiate appropriate interventions as ordered  Outcome: Adequate for Discharge

## 2023-03-31 ENCOUNTER — PATIENT OUTREACH (OUTPATIENT)
Dept: CASE MANAGEMENT | Age: 53
End: 2023-03-31

## 2023-03-31 DIAGNOSIS — Z02.9 ENCOUNTERS FOR ADMINISTRATIVE PURPOSE: ICD-10-CM

## 2023-03-31 LAB — T PALLIDUM AB SER QL: NEGATIVE

## 2023-03-31 NOTE — DISCHARGE SUMMARY
[facility]    PATIENT'S NAME: Lani Pitts   ATTENDING PHYSICIAN: Ajit Jain MD   PATIENT ACCOUNT#:   [id number]    LOCATION:  [address] [place] [address] RECORD #:   [id number]       YOB: 1970  ADMISSION DATE:       03/29/2023      DISCHARGE DATE:  03/30/2023    DISCHARGE SUMMARY    About 1 hour was spent preparing this discharge. DISCHARGE DIAGNOSIS:  Transient memory loss, perhaps related to anxiety, other issue. Workup completely normal so far. HISTORY AND HOSPITAL COURSE:  This is a very pleasant, 49-year-old female, appears much younger than her stated age, who presents with a history of presenting to the emergency room with altered mental status. Actually a stroke alert was called. She had abnormal, involuntary movements of her upper extremity, cramping and other memory issues. She had somewhat pressured speech and does have difficulty staying on topic. She was seen by Neurology who performed an EEG and that was normal.  The patient could not complete an MRI because her leg started cramping, but she seemed to improve. She felt she was better with Flexeril. She has a history of diabetes, taken care of by Dr. Aixa Geiger, but she prefers NovoLog to Humalog, which she has a large stock at home, and I explained that they are essentially equivalent, she should use Humalog to use it up. She normally does not like Levemir or any long-acting insulin. I asked her to follow her diet. She also was very concerned about the cramping in her legs and asked that I promise to give her Flexeril, continue anti-depressants, and followup for neuropsych testing and for MRI, other issues as an outpatient. I asked her not to drive until cleared by Dr. Muna Glaser or Dr. Corina Easton and I asked her not to climb on ladders and, in general, to be accompanied until we know that she is okay. RPR, MRI, autoimmune panel are pending and need to be followed up.       PHYSICAL EXAMINATION ON DISCHARGE: VITAL SIGNS:  Temperature is 98.4; pulse 72; respiratory rate 16; blood pressure is 110/61; 93%. LUNGS:  Occasional rhonchi. HEART:  Normal S1, S2, no S3.  ABDOMEN:  Soft. EXTREMITIES:  Without calf tenderness. NEUROLOGICAL:  She is alert and oriented, friendly and cooperative, has pressured speech, and difficulty sticking to topic but otherwise seems engaging and friendly. LABORATORY STUDIES:  Please see chart. ASSESSMENT AND PLAN:    1. Transient memory loss and altered mental status, perhaps related to anxiety. The patient's drug screen was negative. All her workup is negative, but her MRI, RPR, autoimmune panel are pending. These need followup with Neurology and she probably would need neuropsych testing. She was also seen by Psychiatry and given followup resources by them and she was started on a low dose anti-depressant. 2.   Anxiety and major depressive disorder. 3.   Type 2 diabetes, continue medications. Followup with Dr. Sue Alcantara. Please note the patient is not suicidal at this time. Also noted when I was discharging the patient and her family was present and with her permission, I discussed with them. Her daughter came in, in the middle of the discussion, and the patient also said it was okay to continue. When I was talking, I heard barking coming from the daughter's bag and she had a puppy zippered into her purse. I discussed with her that it was not our policy to allow non-therapy dogs,  animals into the hospital and that she certainly should not have the puppy zippered into her purse. I asked her to give the puppy some air and to take the puppy out of the hospital as soon as possible. CONDITION ON DISCHARGE:  Stable. CODE STATUS:  Not determined during this hospitalization. DISCHARGE MEDICATIONS:    1. Humalog injected 3 times a day before meals as per sliding scale which I gave her. 2.   Metformin 500 mg 3 times a day.   3.   Tylenol 500 mg every 4 to 6 hours as needed. Watch total daily Tylenol, limit to 3 g.   4.   Escitalopram 5 mg nightly. Watch for severe muscle stiffness and fever. 5.   Flexeril 10 mg 3 times a day as needed. Watch drowsiness, no alcohol. 6.   Lorazepam 0.25 mg every 8 hours as needed for anxiety. Watch drowsiness, no alcohol. 7.   Insulin pens as needed. DIET:  2000 calorie ADA. ACTIVITY:  No driving, no heavy exercise, otherwise as tolerated. FOLLOWUP:  Dr. Zo Carmichael in 1 week. Followup with Dr. Vanda Coronado in 2 weeks for neuropsych testing. Followup with Dr. Sue Alcantara in 2 weeks. Return to the hospital if there is any suicidal thoughts or any recurrence of her symptoms. RISK OF READMISSION:  Very high. TCM followup recommended. Dictated By Laurie Espinal.  MD Cristobal  d: 03/30/2023 19:15:03  t: 03/31/2023 02:47:01  Job [id number]  LORIN/

## 2023-04-06 ENCOUNTER — OFFICE VISIT (OUTPATIENT)
Dept: FAMILY MEDICINE CLINIC | Facility: CLINIC | Age: 53
End: 2023-04-06

## 2023-04-06 VITALS
BODY MASS INDEX: 29.12 KG/M2 | HEIGHT: 63 IN | DIASTOLIC BLOOD PRESSURE: 71 MMHG | HEART RATE: 94 BPM | SYSTOLIC BLOOD PRESSURE: 119 MMHG | TEMPERATURE: 97 F | WEIGHT: 164.38 LBS | OXYGEN SATURATION: 96 %

## 2023-04-06 DIAGNOSIS — F41.9 ANXIETY WITH SOMATIZATION: ICD-10-CM

## 2023-04-06 DIAGNOSIS — R73.09 OTHER ABNORMAL GLUCOSE: ICD-10-CM

## 2023-04-06 DIAGNOSIS — R53.83 OTHER FATIGUE: Primary | ICD-10-CM

## 2023-04-06 DIAGNOSIS — Z12.11 COLON CANCER SCREENING: ICD-10-CM

## 2023-04-06 DIAGNOSIS — F45.0 ANXIETY WITH SOMATIZATION: ICD-10-CM

## 2023-04-06 RX ORDER — ERGOCALCIFEROL 1.25 MG/1
CAPSULE ORAL
COMMUNITY
Start: 2023-04-03

## 2023-04-06 RX ORDER — GLIMEPIRIDE 1 MG/1
1 TABLET ORAL
COMMUNITY
Start: 2023-04-03

## 2023-04-06 RX ORDER — POTASSIUM CHLORIDE 750 MG/1
10 TABLET, FILM COATED, EXTENDED RELEASE ORAL
Qty: 30 TABLET | Refills: 0 | Status: SHIPPED | OUTPATIENT
Start: 2023-04-06

## 2023-04-06 RX ORDER — ERGOCALCIFEROL 1.25 MG/1
1 CAPSULE ORAL WEEKLY
COMMUNITY
Start: 2023-04-03

## 2023-04-06 RX ORDER — METFORMIN HYDROCHLORIDE 500 MG/1
TABLET, EXTENDED RELEASE ORAL
COMMUNITY
Start: 2023-04-03

## 2023-04-07 ENCOUNTER — TELEPHONE (OUTPATIENT)
Dept: NEUROLOGY | Facility: CLINIC | Age: 53
End: 2023-04-07

## 2023-04-07 LAB
GANGLIONIC ACETYLCHOLINE RECEPTOR AB: 0 PMOL/L
GLUTAMIC ACID DECARBOXYLASE AB: <5 IU/ML
NEURONAL ANTIBODY (AMPHIPHYSIN): NEGATIVE
P/Q-TYPE CALCIUM CHANNEL ANTIBODY: 0 PMOL/L
SOX1 ANTIBODY, IGG BY IMMUNOBLOT, SERUM: NEGATIVE
VOLTAGE-GATED POTASSIUM CHANNEL: 0 PMOL/L

## 2023-04-07 NOTE — TELEPHONE ENCOUNTER
----- Message from Dina Del Rosaroi MD sent at 4/7/2023 10:32 AM CDT -----  Please let the patient know that results of these particular lab tests so far were normal.    Thank you

## 2023-08-02 ENCOUNTER — TELEPHONE (OUTPATIENT)
Dept: FAMILY MEDICINE CLINIC | Facility: CLINIC | Age: 53
End: 2023-08-02

## 2023-08-02 ENCOUNTER — MED REC SCAN ONLY (OUTPATIENT)
Dept: FAMILY MEDICINE CLINIC | Facility: CLINIC | Age: 53
End: 2023-08-02

## 2023-08-08 ENCOUNTER — TELEPHONE (OUTPATIENT)
Dept: FAMILY MEDICINE CLINIC | Facility: CLINIC | Age: 53
End: 2023-08-08

## 2023-08-25 ENCOUNTER — MED REC SCAN ONLY (OUTPATIENT)
Dept: FAMILY MEDICINE CLINIC | Facility: CLINIC | Age: 53
End: 2023-08-25

## 2023-08-25 ENCOUNTER — DOCUMENTATION ONLY (OUTPATIENT)
Dept: FAMILY MEDICINE CLINIC | Facility: CLINIC | Age: 53
End: 2023-08-25

## 2023-08-25 LAB — AMB EXT COLOGUARD RESULT: NEGATIVE

## 2023-10-13 ENCOUNTER — TELEPHONE (OUTPATIENT)
Dept: FAMILY MEDICINE CLINIC | Facility: CLINIC | Age: 53
End: 2023-10-13

## 2023-12-04 ENCOUNTER — TELEPHONE (OUTPATIENT)
Dept: FAMILY MEDICINE CLINIC | Facility: CLINIC | Age: 53
End: 2023-12-04

## 2024-02-22 ENCOUNTER — TELEPHONE (OUTPATIENT)
Dept: FAMILY MEDICINE CLINIC | Facility: CLINIC | Age: 54
End: 2024-02-22

## 2024-02-28 ENCOUNTER — NURSE TRIAGE (OUTPATIENT)
Dept: FAMILY MEDICINE CLINIC | Facility: CLINIC | Age: 54
End: 2024-02-28

## 2024-02-28 NOTE — TELEPHONE ENCOUNTER
Action Requested: Summary for Provider     []  Critical Lab, Recommendations Needed  [] Need Additional Advice  []   FYI    []   Need Orders  [] Need Medications Sent to Pharmacy  []  Other     SUMMARY: Per protocol advised : Office visit   Future Appointments   Date Time Provider Department Center   3/1/2024  1:15 PM Cintia Moore MD [facility]  [place]       Reason for call: Blood Sugar  Onset: Data Unavailable    Patient calling ( identified name and  ) states  having elevated BG levels    Reports FBS usually 200  2 hours later is over 300    Has been running this  way for a few weeks    She only takes oral meds at this time ,  her insulin is  last does was about one month ago     Endo has no openings for months      Care Advice             Patient/Caregiver understands and will follow care advice?: Yes, plans to follow advice                        Needs an appointment     MEASURE AND RECORD YOUR BLOOD GLUCOSE:  * Measure your blood glucose before breakfast and before going to bed.  * Keep a log and show it to your doctor (or NP/PA) at your next office visit.    CALL BACK IF:  * Blood glucose over 300 mg/dL (16.7 mmol/L), two or more times in a row.  * Urine ketones become moderate or large (or more than 1+); if you check blood ketones, blood ketone test is over 1.4 mmol/L  * Vomiting lasting over 4 hours or unable to drink any fluids  * Rapid breathing occurs  * You have more questions  * You become worse                   Patient verbalizes understanding and agrees with plan.     Reason for Disposition   Blood glucose > 300 mg/dL (16.7 mmol/L) AND two or more times in a row    Protocols used: Diabetes - High Blood Sugar-A-OH

## 2024-03-01 ENCOUNTER — LAB ENCOUNTER (OUTPATIENT)
Dept: LAB | Age: 54
End: 2024-03-01
Attending: STUDENT IN AN ORGANIZED HEALTH CARE EDUCATION/TRAINING PROGRAM
Payer: COMMERCIAL

## 2024-03-01 ENCOUNTER — OFFICE VISIT (OUTPATIENT)
Dept: FAMILY MEDICINE CLINIC | Facility: CLINIC | Age: 54
End: 2024-03-01
Payer: COMMERCIAL

## 2024-03-01 VITALS
WEIGHT: 162.63 LBS | HEART RATE: 85 BPM | OXYGEN SATURATION: 95 % | BODY MASS INDEX: 28.82 KG/M2 | DIASTOLIC BLOOD PRESSURE: 71 MMHG | SYSTOLIC BLOOD PRESSURE: 111 MMHG | TEMPERATURE: 99 F | HEIGHT: 63 IN

## 2024-03-01 DIAGNOSIS — E55.9 VITAMIN D DEFICIENCY: ICD-10-CM

## 2024-03-01 DIAGNOSIS — E11.65 UNCONTROLLED TYPE 2 DIABETES MELLITUS WITH HYPERGLYCEMIA (HCC): ICD-10-CM

## 2024-03-01 DIAGNOSIS — Z12.11 COLON CANCER SCREENING: ICD-10-CM

## 2024-03-01 DIAGNOSIS — R53.83 OTHER FATIGUE: ICD-10-CM

## 2024-03-01 DIAGNOSIS — Z00.00 WELL ADULT EXAM: Primary | ICD-10-CM

## 2024-03-01 DIAGNOSIS — I10 ESSENTIAL HYPERTENSION, BENIGN: ICD-10-CM

## 2024-03-01 DIAGNOSIS — Z12.31 ENCOUNTER FOR SCREENING MAMMOGRAM FOR MALIGNANT NEOPLASM OF BREAST: ICD-10-CM

## 2024-03-01 PROBLEM — K21.00 GASTROESOPHAGEAL REFLUX DISEASE WITH ESOPHAGITIS WITHOUT HEMORRHAGE: Status: ACTIVE | Noted: 2022-03-25

## 2024-03-01 PROBLEM — F29 PSYCHOSIS (HCC): Status: ACTIVE | Noted: 2023-12-09

## 2024-03-01 PROBLEM — K75.81 NASH (NONALCOHOLIC STEATOHEPATITIS): Status: ACTIVE | Noted: 2023-09-11

## 2024-03-01 PROBLEM — R31.21 ASYMPTOMATIC MICROSCOPIC HEMATURIA: Status: ACTIVE | Noted: 2021-07-17

## 2024-03-01 PROBLEM — E11.9 TYPE 2 DIABETES MELLITUS WITHOUT COMPLICATION (HCC): Status: ACTIVE | Noted: 2024-03-01

## 2024-03-01 PROBLEM — L72.3 SEBACEOUS CYST: Status: ACTIVE | Noted: 2022-10-15

## 2024-03-01 PROBLEM — M79.10 MYALGIA DUE TO STATIN: Status: ACTIVE | Noted: 2024-03-01

## 2024-03-01 PROBLEM — F32.A DEPRESSIVE DISORDER: Status: ACTIVE | Noted: 2022-10-15

## 2024-03-01 PROBLEM — M25.562 ACUTE PAIN OF LEFT KNEE: Status: ACTIVE | Noted: 2022-03-25

## 2024-03-01 PROBLEM — K44.9 HIATAL HERNIA: Status: ACTIVE | Noted: 2022-12-04

## 2024-03-01 PROBLEM — E78.00 HYPERCHOLESTEREMIA: Status: ACTIVE | Noted: 2021-07-17

## 2024-03-01 PROBLEM — T46.6X5A MYALGIA DUE TO STATIN: Status: ACTIVE | Noted: 2024-03-01

## 2024-03-01 LAB
ALBUMIN SERPL-MCNC: 4.8 G/DL (ref 3.2–4.8)
ALBUMIN/GLOB SERPL: 1.8 {RATIO} (ref 1–2)
ALP LIVER SERPL-CCNC: 77 U/L
ALT SERPL-CCNC: 51 U/L
ANION GAP SERPL CALC-SCNC: 7 MMOL/L (ref 0–18)
AST SERPL-CCNC: 32 U/L (ref ?–34)
BILIRUB SERPL-MCNC: 0.3 MG/DL (ref 0.3–1.2)
BILIRUB UR QL: NEGATIVE
BUN BLD-MCNC: 14 MG/DL (ref 9–23)
BUN/CREAT SERPL: 18.7 (ref 10–20)
CALCIUM BLD-MCNC: 10.3 MG/DL (ref 8.7–10.4)
CARTRIDGE LOT#: ABNORMAL NUMERIC
CHLORIDE SERPL-SCNC: 108 MMOL/L (ref 98–112)
CHOLEST SERPL-MCNC: 266 MG/DL (ref ?–200)
CLARITY UR: CLEAR
CO2 SERPL-SCNC: 23 MMOL/L (ref 21–32)
COLOR UR: COLORLESS
CREAT BLD-MCNC: 0.75 MG/DL
CREAT UR-SCNC: 26.9 MG/DL
DEPRECATED RDW RBC AUTO: 41.5 FL (ref 35.1–46.3)
EGFRCR SERPLBLD CKD-EPI 2021: 94 ML/MIN/1.73M2 (ref 60–?)
ERYTHROCYTE [DISTWIDTH] IN BLOOD BY AUTOMATED COUNT: 12.8 % (ref 11–15)
FASTING PATIENT LIPID ANSWER: NO
FASTING STATUS PATIENT QL REPORTED: NO
GLOBULIN PLAS-MCNC: 2.6 G/DL (ref 2.8–4.4)
GLUCOSE BLD-MCNC: 236 MG/DL (ref 70–99)
GLUCOSE UR-MCNC: >1000 MG/DL
HCT VFR BLD AUTO: 41.9 %
HDLC SERPL-MCNC: 33 MG/DL (ref 40–59)
HEMOGLOBIN A1C: 9.7 % (ref 4.3–5.6)
HGB BLD-MCNC: 14.3 G/DL
KETONES UR-MCNC: NEGATIVE MG/DL
LDLC SERPL DIRECT ASSAY-MCNC: 93 MG/DL (ref ?–100)
LEUKOCYTE ESTERASE UR QL STRIP.AUTO: NEGATIVE
MCH RBC QN AUTO: 30 PG (ref 26–34)
MCHC RBC AUTO-ENTMCNC: 34.1 G/DL (ref 31–37)
MCV RBC AUTO: 88 FL
MICROALBUMIN UR-MCNC: <0.3 MG/DL
NITRITE UR QL STRIP.AUTO: NEGATIVE
NONHDLC SERPL-MCNC: 233 MG/DL (ref ?–130)
OSMOLALITY SERPL CALC.SUM OF ELEC: 294 MOSM/KG (ref 275–295)
PH UR: 5 [PH] (ref 5–8)
PLATELET # BLD AUTO: 256 10(3)UL (ref 150–450)
POTASSIUM SERPL-SCNC: 4.1 MMOL/L (ref 3.5–5.1)
PROT SERPL-MCNC: 7.4 G/DL (ref 5.7–8.2)
PROT UR-MCNC: NEGATIVE MG/DL
RBC # BLD AUTO: 4.76 X10(6)UL
SODIUM SERPL-SCNC: 138 MMOL/L (ref 136–145)
SP GR UR STRIP: 1.01 (ref 1–1.03)
TRIGL SERPL-MCNC: 1140 MG/DL (ref 30–149)
TSI SER-ACNC: 1.57 MIU/ML (ref 0.55–4.78)
UROBILINOGEN UR STRIP-ACNC: NORMAL
VIT D+METAB SERPL-MCNC: 14 NG/ML (ref 30–100)
WBC # BLD AUTO: 8.7 X10(3) UL (ref 4–11)

## 2024-03-01 PROCEDURE — 80061 LIPID PANEL: CPT

## 2024-03-01 PROCEDURE — 81001 URINALYSIS AUTO W/SCOPE: CPT

## 2024-03-01 PROCEDURE — 80053 COMPREHEN METABOLIC PANEL: CPT

## 2024-03-01 PROCEDURE — 36415 COLL VENOUS BLD VENIPUNCTURE: CPT

## 2024-03-01 PROCEDURE — 84443 ASSAY THYROID STIM HORMONE: CPT

## 2024-03-01 PROCEDURE — 85027 COMPLETE CBC AUTOMATED: CPT

## 2024-03-01 PROCEDURE — 83721 ASSAY OF BLOOD LIPOPROTEIN: CPT

## 2024-03-01 PROCEDURE — 82570 ASSAY OF URINE CREATININE: CPT

## 2024-03-01 PROCEDURE — 82306 VITAMIN D 25 HYDROXY: CPT

## 2024-03-01 PROCEDURE — 82043 UR ALBUMIN QUANTITATIVE: CPT

## 2024-03-01 RX ORDER — PERPHENAZINE 16 MG/1
1 TABLET, FILM COATED ORAL 4 TIMES DAILY
COMMUNITY
Start: 2023-10-24

## 2024-03-01 RX ORDER — AZITHROMYCIN 250 MG/1
TABLET, FILM COATED ORAL
COMMUNITY
Start: 2023-10-09 | End: 2024-03-01

## 2024-03-01 RX ORDER — ESTRADIOL 0.1 MG/G
1 CREAM VAGINAL EVERY OTHER DAY
COMMUNITY
Start: 2023-09-12 | End: 2024-03-01

## 2024-03-01 RX ORDER — OLANZAPINE 5 MG/1
TABLET ORAL
COMMUNITY
Start: 2023-12-26

## 2024-03-01 RX ORDER — TIRZEPATIDE 2.5 MG/.5ML
2.5 INJECTION, SOLUTION SUBCUTANEOUS WEEKLY
Qty: 2 ML | Refills: 1 | Status: SHIPPED | OUTPATIENT
Start: 2024-03-01

## 2024-03-01 RX ORDER — OMEGA-3 FATTY ACIDS/FISH OIL 300-1000MG
200 CAPSULE ORAL
COMMUNITY

## 2024-03-01 RX ORDER — GEMFIBROZIL 600 MG/1
TABLET, FILM COATED ORAL
COMMUNITY
Start: 2023-07-19 | End: 2024-03-01

## 2024-03-01 RX ORDER — CLINDAMYCIN HYDROCHLORIDE 300 MG/1
CAPSULE ORAL
COMMUNITY
Start: 2023-12-30 | End: 2024-03-01

## 2024-03-01 RX ORDER — REPAGLINIDE 0.5 MG/1
0.5 TABLET ORAL
COMMUNITY
Start: 2023-08-25 | End: 2024-03-01

## 2024-03-01 RX ORDER — TIRZEPATIDE 5 MG/.5ML
5 INJECTION, SOLUTION SUBCUTANEOUS WEEKLY
Qty: 2 ML | Refills: 1 | Status: SHIPPED | OUTPATIENT
Start: 2024-04-01

## 2024-03-01 RX ORDER — DAPAGLIFLOZIN 10 MG/1
1 TABLET, FILM COATED ORAL AS NEEDED
COMMUNITY
End: 2024-03-01

## 2024-03-01 NOTE — PROGRESS NOTES
HPI:    Patient ID: Aziza De La Torre is a 53 year old female.    HPI  Pt presenting for routine physical exam. No significant chronic medical problems. Past medical/surgical history, family history, and social history were reviewed.     H/o T2DM, followed by Romel Adame  Last A1c 10.0 Dec 2023 --> improved to 9.7 today  Reports elevated BG levels  Feels Humalog dosing is better than Lantus  Continues Metformin, Glimepiride  Seen by Alex patel 6 weeks ago  Inquiring about Atorvastatin dosing  Still not feeling right after CT angiogram    Recent dental work 3 weeks ago    Review of Systems   A comprehensive 10 point review of systems was completed.  Pertinent positives and negatives noted in the the HPI.       Current Outpatient Medications   Medication Sig Dispense Refill    CONTOUR NEXT TEST In Vitro Strip 1 each 4 (four) times daily.      Ibuprofen 200 MG Oral Cap Take 1 capsule (200 mg total) by mouth.      OLANZapine 5 MG Oral Tab       Tirzepatide (MOUNJARO) 2.5 MG/0.5ML Subcutaneous Solution Pen-injector Inject 2.5 mg into the skin once a week. 2 mL 1    [START ON 4/1/2024] Tirzepatide (MOUNJARO) 5 MG/0.5ML Subcutaneous Solution Pen-injector Inject 5 mg into the skin once a week. 2 mL 1    ergocalciferol 1.25 MG (35196 UT) Oral Cap       ergocalciferol 1.25 MG (86160 UT) Oral Cap Take 1 capsule (50,000 Units total) by mouth once a week.      metFORMIN  MG Oral Tablet 24 Hr       Insulin Pen Needle 32G X 4 MM Does not apply Misc May substitute if not on insurance formulary 100 each 5    ergocalciferol 1.25 MG (41179 UT) Oral Cap Take 1 capsule (50,000 Units total) by mouth once a week. 24 capsule 0    atorvastatin 10 MG Oral Tab Take 1 tablet (10 mg total) by mouth nightly. 90 tablet 3     Allergies:  Allergies   Allergen Reactions    Fish-Derived Products SWELLING    Penicillins SHORTNESS OF BREATH and SWELLING    Shellfish Allergy ANAPHYLAXIS and SWELLING    Metformin OTHER (SEE COMMENTS)     Memory  problems      Vitals:    03/01/24 1312   BP: 111/71   Pulse: 85   Temp: 98.5 °F (36.9 °C)   TempSrc: Temporal   SpO2: 95%   Weight: 162 lb 9.6 oz (73.8 kg)   Height: 5' 3\" (1.6 m)       Body mass index is 28.8 kg/m².   PHYSICAL EXAM:   Physical Exam  Vitals reviewed.   Constitutional:       General: She is not in acute distress.     Appearance: Normal appearance. She is well-developed.   HENT:      Head: Normocephalic and atraumatic.      Right Ear: Tympanic membrane, ear canal and external ear normal.      Left Ear: Tympanic membrane, ear canal and external ear normal.   Eyes:      Extraocular Movements: Extraocular movements intact.      Conjunctiva/sclera: Conjunctivae normal.      Pupils: Pupils are equal, round, and reactive to light.   Neck:      Thyroid: No thyroid mass or thyroid tenderness.   Cardiovascular:      Rate and Rhythm: Normal rate and regular rhythm.      Pulses: Normal pulses.      Heart sounds: Normal heart sounds, S1 normal and S2 normal. No murmur heard.  Pulmonary:      Effort: Pulmonary effort is normal. No respiratory distress.      Breath sounds: Normal breath sounds. No wheezing, rhonchi or rales.   Abdominal:      General: Bowel sounds are normal.      Palpations: Abdomen is soft.      Tenderness: There is no abdominal tenderness. There is no guarding or rebound.   Musculoskeletal:      Cervical back: Normal range of motion and neck supple. No muscular tenderness.      Right lower leg: No edema.      Left lower leg: No edema.   Feet:      Comments: Bilateral barefoot skin diabetic exam is normal, visualized feet and the appearance is normal.  Bilateral monofilament/sensation of both feet is normal.  Pulsation pedal pulse exam of both lower legs/feet is normal as well.  Lymphadenopathy:      Cervical: No cervical adenopathy.   Skin:     General: Skin is warm and dry.      Coloration: Skin is not jaundiced.   Neurological:      General: No focal deficit present.      Mental Status: She is  alert and oriented to person, place, and time. Mental status is at baseline.   Psychiatric:         Attention and Perception: Attention normal.         Mood and Affect: Mood normal.         Behavior: Behavior normal. Behavior is cooperative.         Cognition and Memory: Cognition normal.             ASSESSMENT/PLAN:   1. Well adult exam  Discussed preventative screenings  - Pap 1/2023  - Cologuard 2023 negative  - mammogram ordered  - will check labs as below  - encouraged to continue diet/exercise for overall wellness  - follow-up with eye doctor annually  - follow-up with dentist every 6 months  - return yearly for physicals  - annual flu shot  - tetanus booster every 10yrs    2. Uncontrolled type 2 diabetes mellitus with hyperglycemia (HCC)  A1c 9.7 today  - discussed home blood glucose monitoring  - counseled on healthy diet and lifestyle changes for overall wellness, which includes decreased carb and sugar intake, increased fiber intake, and increased water intake as tolerated  - discussed exercise as able  - provided with diabetic diet information  - will start Mounjaro dosing -- discussed side effects, dose titration  - POC Glycohemoglobin [69316]  - Tirzepatide (MOUNJARO) 2.5 MG/0.5ML Subcutaneous Solution Pen-injector; Inject 2.5 mg into the skin once a week.  Dispense: 2 mL; Refill: 1  - Tirzepatide (MOUNJARO) 5 MG/0.5ML Subcutaneous Solution Pen-injector; Inject 5 mg into the skin once a week.  Dispense: 2 mL; Refill: 1  - Microalb/Creat Ratio, Random Urine; Future  - Lipid Panel; Future    3. Essential hypertension, benign  In-office BP stable, pt otherwise asymptomatic  - discussed benefits of DASH diet and daily activity  - continue BP monitoring  - continue daily medication  - will check labwork  - advised to call if BP is persistently elevated  - TSH W Reflex To Free T4; Future  - CBC, Platelet; No Differential; Future  - Comp Metabolic Panel (14); Future  - Urinalysis, Routine; Future    4. Vitamin  D deficiency  - Vitamin D; Future    5. Encounter for screening mammogram for malignant neoplasm of breast  - [facility] ARNEL 2D+3D SCREENING BILAT (CPT=77067/44845); Future    6. Colon cancer screening  Cologuard 2023 negative    Follow-up in 2 months for surveillance. Pt verbalized understanding and agrees with plan.    Orders Placed This Encounter   Procedures    POC Glycohemoglobin [54517]    TSH W Reflex To Free T4    CBC, Platelet; No Differential    Comp Metabolic Panel (14)    Vitamin D    Urinalysis, Routine    Microalb/Creat Ratio, Random Urine    Lipid Panel    [facility] AMB COLOGUARD (RESULTS ONLY)       Meds This Visit:  Requested Prescriptions     Signed Prescriptions Disp Refills    Tirzepatide (MOUNJARO) 2.5 MG/0.5ML Subcutaneous Solution Pen-injector 2 mL 1     Sig: Inject 2.5 mg into the skin once a week.    Tirzepatide (MOUNJARO) 5 MG/0.5ML Subcutaneous Solution Pen-injector 2 mL 1     Sig: Inject 5 mg into the skin once a week.       Imaging & Referrals:  [facility] ARNEL 2D+3D SCREENING BILAT (CPT=77067/86806)         ID#[id number]

## 2024-03-08 ENCOUNTER — DOCUMENTATION ONLY (OUTPATIENT)
Dept: FAMILY MEDICINE CLINIC | Facility: CLINIC | Age: 54
End: 2024-03-08

## 2024-03-18 ENCOUNTER — OFFICE VISIT (OUTPATIENT)
Dept: FAMILY MEDICINE CLINIC | Facility: CLINIC | Age: 54
End: 2024-03-18

## 2024-03-18 ENCOUNTER — NURSE TRIAGE (OUTPATIENT)
Dept: FAMILY MEDICINE CLINIC | Facility: CLINIC | Age: 54
End: 2024-03-18

## 2024-03-18 VITALS
TEMPERATURE: 99 F | HEIGHT: 63 IN | DIASTOLIC BLOOD PRESSURE: 72 MMHG | SYSTOLIC BLOOD PRESSURE: 111 MMHG | WEIGHT: 162 LBS | HEART RATE: 74 BPM | RESPIRATION RATE: 16 BRPM | BODY MASS INDEX: 28.7 KG/M2

## 2024-03-18 DIAGNOSIS — R04.0 EPISTAXIS: Primary | ICD-10-CM

## 2024-03-18 PROCEDURE — 99213 OFFICE O/P EST LOW 20 MIN: CPT | Performed by: FAMILY MEDICINE

## 2024-03-18 PROCEDURE — 3074F SYST BP LT 130 MM HG: CPT | Performed by: FAMILY MEDICINE

## 2024-03-18 PROCEDURE — 3078F DIAST BP <80 MM HG: CPT | Performed by: FAMILY MEDICINE

## 2024-03-18 PROCEDURE — 3008F BODY MASS INDEX DOCD: CPT | Performed by: FAMILY MEDICINE

## 2024-03-18 NOTE — TELEPHONE ENCOUNTER
Action Requested: Summary for Provider     []  Critical Lab, Recommendations Needed  [] Need Additional Advice  [x]   FYI    []   Need Orders  [] Need Medications Sent to Pharmacy  []  Other     SUMMARY: Appointment today Dr Liz      Reason for call: Nose bleed, fatigue, chornic memory issues   Onset: Saturday 3/16/24    Patient called office. Date of birth and full name both confirmed.   Triaged per protocol and advised care advice per protocol.   She is wondering if Atorvastatin caused her nosebleed  Nosebleed x 1 Saturday - about 5 minutes duration.   No bleeding since then.   Fatigue.   Memory issues persisting. But alert and oriented to self, situation, date.   Denies shortness of breath, difficulty breathing, chest pain, chest pressure.   More details regarding Symptoms under Additional Documentation > Protocols Used.     Evaluation advised today.   Provided address. Recommended someone drive her.   But she says she can drive herself.   Advised to monitor symptoms.  RN advised if symptoms get severely worse, patient should seek care at Emergency Room or Immediate Care.  RN also informed patient to seek immediate medical attention at ER if patient experiences severe/worsening symptoms, shortness of breath, chest pain, or severe pain.  Patient verbalizes understanding and is agreeable to instructions.     Future Appointments   Date Time Provider Department Center   3/18/2024  1:20 PM Henrique Liz MD [facility] ALONDRA [place]           Reason for Disposition  • Patient wants to be seen    Protocols used: Nosebleed-A-OH

## 2024-03-18 NOTE — PROGRESS NOTES
Subjective:   Patient ID: Aziza De La Torre is a 53 year old female.    Pt presents with an episode of nose bleed after blowing nose. No injury or trauma. No hx of bleeding issues. Did take ibuprofen recently but no hx of blood thinner use or sig illness/ fevers.    Was concerned nose bleed related to atorvastatin as she just started this.        History/Other:   Review of Systems   Constitutional:  Negative for fever.     Current Outpatient Medications   Medication Sig Dispense Refill    ergocalciferol 1.25 MG (17224 UT) Oral Cap Take 1 capsule (50,000 Units total) by mouth once a week. 24 capsule 0    CONTOUR NEXT TEST In Vitro Strip 1 each 4 (four) times daily.      Ibuprofen 200 MG Oral Cap Take 1 capsule (200 mg total) by mouth.      OLANZapine 5 MG Oral Tab       Tirzepatide (MOUNJARO) 2.5 MG/0.5ML Subcutaneous Solution Pen-injector Inject 2.5 mg into the skin once a week. 2 mL 1    [START ON 4/1/2024] Tirzepatide (MOUNJARO) 5 MG/0.5ML Subcutaneous Solution Pen-injector Inject 5 mg into the skin once a week. 2 mL 1    ergocalciferol 1.25 MG (69433 UT) Oral Cap       ergocalciferol 1.25 MG (90465 UT) Oral Cap Take 1 capsule (50,000 Units total) by mouth once a week.      metFORMIN  MG Oral Tablet 24 Hr       Insulin Pen Needle 32G X 4 MM Does not apply Misc May substitute if not on insurance formulary 100 each 5    atorvastatin 10 MG Oral Tab Take 1 tablet (10 mg total) by mouth nightly. (Patient not taking: Reported on 3/18/2024) 90 tablet 3     Allergies:  Allergies   Allergen Reactions    Fish-Derived Products SWELLING    Penicillins SHORTNESS OF BREATH and SWELLING    Shellfish Allergy ANAPHYLAXIS and SWELLING    Metformin OTHER (SEE COMMENTS)     Memory problems       Objective:   Physical Exam  Vitals reviewed.   Constitutional:       Appearance: Normal appearance. She is well-developed.   HENT:      Nose: No congestion.      Comments: Dried blood of right nares. No active bleeding.     Mouth/Throat:       Mouth: Mucous membranes are moist.      Pharynx: No oropharyngeal exudate or posterior oropharyngeal erythema.   Cardiovascular:      Rate and Rhythm: Normal rate and regular rhythm.      Heart sounds: Normal heart sounds.   Pulmonary:      Effort: Pulmonary effort is normal. No respiratory distress.      Breath sounds: Normal breath sounds. No wheezing or rales.   Musculoskeletal:      Cervical back: Normal range of motion and neck supple.   Lymphadenopathy:      Cervical: No cervical adenopathy.   Neurological:      Mental Status: She is alert.         Assessment & Plan:   1. Epistaxis: right nares:  - After discussion with patient, to monitor for symptoms and call if any significant symptoms; can hold statin for now. Consider follow up with ENT if recurs. Information provided.        No orders of the defined types were placed in this encounter.      Meds This Visit:  Requested Prescriptions      No prescriptions requested or ordered in this encounter       Imaging & Referrals:  ENT - INTERNAL

## 2024-09-22 ENCOUNTER — HOSPITAL ENCOUNTER (EMERGENCY)
Facility: HOSPITAL | Age: 54
Discharge: HOME | End: 2024-09-22
Attending: EMERGENCY MEDICINE
Payer: COMMERCIAL

## 2024-09-22 VITALS
SYSTOLIC BLOOD PRESSURE: 110 MMHG | WEIGHT: 160 LBS | HEIGHT: 63 IN | RESPIRATION RATE: 20 BRPM | TEMPERATURE: 97 F | OXYGEN SATURATION: 92 % | HEART RATE: 70 BPM | DIASTOLIC BLOOD PRESSURE: 70 MMHG | BODY MASS INDEX: 28.35 KG/M2

## 2024-09-22 DIAGNOSIS — R73.9 HYPERGLYCEMIA: Primary | ICD-10-CM

## 2024-09-22 LAB
ALBUMIN SERPL-MCNC: 4.4 G/DL (ref 3.2–4.8)
ALBUMIN/GLOB SERPL: 2.1 {RATIO} (ref 1–2)
ALP LIVER SERPL-CCNC: 58 U/L
ALT SERPL-CCNC: 46 U/L
ANION GAP SERPL CALC-SCNC: 11 MMOL/L (ref 0–18)
AST SERPL-CCNC: 53 U/L (ref ?–34)
BASOPHILS # BLD AUTO: 0.03 X10(3) UL (ref 0–0.2)
BASOPHILS NFR BLD AUTO: 0.5 %
BILIRUB SERPL-MCNC: 0.4 MG/DL (ref 0.3–1.2)
BILIRUB UR QL: NEGATIVE
BUN BLD-MCNC: 14 MG/DL (ref 9–23)
CALCIUM BLD-MCNC: 9.6 MG/DL (ref 8.7–10.4)
CHLORIDE SERPL-SCNC: 109 MMOL/L (ref 98–112)
CLARITY UR: CLEAR
CO2 SERPL-SCNC: 17 MMOL/L (ref 21–32)
CREAT BLD-MCNC: 0.68 MG/DL
DEPRECATED RDW RBC AUTO: 60.6 FL (ref 35.1–46.3)
EGFRCR SERPLBLD CKD-EPI 2021: 102 ML/MIN/1.73M2 (ref 60–?)
EOSINOPHIL # BLD AUTO: 0.05 X10(3) UL (ref 0–0.7)
EOSINOPHIL NFR BLD AUTO: 0.8 %
ERYTHROCYTE [DISTWIDTH] IN BLOOD BY AUTOMATED COUNT: 20.6 % (ref 11–15)
GLOBULIN PLAS-MCNC: 2.1 G/DL (ref 2–3.5)
GLUCOSE BLD-MCNC: 256 MG/DL (ref 70–99)
GLUCOSE BLDC GLUCOMTR-MCNC: 282 MG/DL (ref 70–99)
GLUCOSE UR-MCNC: >1000 MG/DL
HCT VFR BLD AUTO: 42.3 %
HGB BLD-MCNC: 14.7 G/DL
IMM GRANULOCYTES # BLD AUTO: 0.02 X10(3) UL (ref 0–1)
IMM GRANULOCYTES NFR BLD: 0.3 %
KETONES UR-MCNC: 20 MG/DL
LEUKOCYTE ESTERASE UR QL STRIP.AUTO: NEGATIVE
LYMPHOCYTES # BLD AUTO: 1.79 X10(3) UL (ref 1–4)
LYMPHOCYTES NFR BLD AUTO: 30.3 %
MCH RBC QN AUTO: 32.5 PG (ref 26–34)
MCHC RBC AUTO-ENTMCNC: 34.8 G/DL (ref 31–37)
MCV RBC AUTO: 93.4 FL
MONOCYTES # BLD AUTO: 0.31 X10(3) UL (ref 0.1–1)
MONOCYTES NFR BLD AUTO: 5.3 %
NEUTROPHILS # BLD AUTO: 3.7 X10 (3) UL (ref 1.5–7.7)
NEUTROPHILS # BLD AUTO: 3.7 X10(3) UL (ref 1.5–7.7)
NEUTROPHILS NFR BLD AUTO: 62.8 %
NITRITE UR QL STRIP.AUTO: NEGATIVE
PH UR: 5.5 [PH] (ref 5–8)
PLATELET # BLD AUTO: 245 10(3)UL (ref 150–450)
PLATELET MORPHOLOGY: NORMAL
POTASSIUM SERPL-SCNC: 5.1 MMOL/L (ref 3.5–5.1)
PROT SERPL-MCNC: 6.5 G/DL (ref 5.7–8.2)
PROT SERPL-MCNC: 7.3 G/DL (ref 5.7–8.2)
PROT UR-MCNC: 30 MG/DL
RBC # BLD AUTO: 4.53 X10(6)UL
SODIUM SERPL-SCNC: 137 MMOL/L (ref 136–145)
SP GR UR STRIP: >1.03 (ref 1–1.03)
UROBILINOGEN UR STRIP-ACNC: NORMAL
WBC # BLD AUTO: 5.9 X10(3) UL (ref 4–11)

## 2024-09-22 PROCEDURE — 96360 HYDRATION IV INFUSION INIT: CPT

## 2024-09-22 PROCEDURE — 96361 HYDRATE IV INFUSION ADD-ON: CPT

## 2024-09-22 PROCEDURE — 80053 COMPREHEN METABOLIC PANEL: CPT | Performed by: EMERGENCY MEDICINE

## 2024-09-22 PROCEDURE — 99285 EMERGENCY DEPT VISIT HI MDM: CPT

## 2024-09-22 PROCEDURE — 85025 COMPLETE CBC W/AUTO DIFF WBC: CPT | Performed by: EMERGENCY MEDICINE

## 2024-09-22 PROCEDURE — 82962 GLUCOSE BLOOD TEST: CPT

## 2024-09-22 PROCEDURE — 81001 URINALYSIS AUTO W/SCOPE: CPT | Performed by: EMERGENCY MEDICINE

## 2024-09-22 PROCEDURE — 99284 EMERGENCY DEPT VISIT MOD MDM: CPT

## 2024-09-22 PROCEDURE — 84155 ASSAY OF PROTEIN SERUM: CPT | Performed by: EMERGENCY MEDICINE

## 2024-09-22 PROCEDURE — 82040 ASSAY OF SERUM ALBUMIN: CPT | Performed by: EMERGENCY MEDICINE

## 2024-09-22 RX ORDER — ACETAMINOPHEN 500 MG
1000 TABLET ORAL ONCE
Status: COMPLETED | OUTPATIENT
Start: 2024-09-22 | End: 2024-09-22

## 2024-09-23 NOTE — ED PROVIDER NOTES
[facility]  Emergency Department Attending Note     Chief Complaint:   Chief Complaint   Patient presents with    Hyperglycemia     HISTORY OF PRESENT ILLNESS:   Aziza De La Torre is a 54 year old female who presents to the ED medical history including diabetes presents with complaint of elevated blood sugar.  The patient states that she has seen multiple endocrinologists and states that she is trying a new technique of controlling her blood sugar by not eating and stopping her metformin.  She states she did not work with an endocrinologist or her doctor to come up with this plan.  At this time.  Has occasional polyuria polydipsia but not today.  Denies abdominal pain.  Denies fever chills.  Denies headache vision changes slurred speech paralysis or paresthesia.     MEDICAL & SOCIAL HISTORY:   Past Medical History:    Allergic rhinitis    Diabetes (HCC)    History reviewed. No pertinent surgical history.   Social History     Socioeconomic History    Marital status:    Tobacco Use    Smoking status: Former     Current packs/day: 0.50     Types: Cigarettes    Smokeless tobacco: Never   Vaping Use    Vaping status: Never Used   Substance and Sexual Activity    Alcohol use: Not Currently    Drug use: Never   Other Topics Concern    History of tanning Yes    Reaction to local anesthetic No     Social Determinants of Health     Financial Resource Strain: Low Risk  (7/10/2024)    Received from [facility]    Overall Financial Resource Strain (CARDIA)     Difficulty of Paying Living Expenses: Not very hard   Food Insecurity: No Food Insecurity (7/10/2024)    Received from [facility]    Hunger Vital Sign     Worried About Running Out of Food in the Last Year: Never true     Ran Out of Food in the Last Year: Never true   Transportation Needs: No Transportation Needs (7/10/2024)    Received from [facility]    PRAPARE - Transportation     Lack of  Transportation (Medical): No     Lack of Transportation (Non-Medical): No   Housing Stability: Unknown (7/10/2024)    Received from [facility]    Housing Stability Vital Sign     Unable to Pay for Housing in the Last Year: No     Unstable Housing in the Last Year: No      Allergies   Allergen Reactions    Fish-Derived Products SWELLING    Penicillins SHORTNESS OF BREATH and SWELLING    Shellfish Allergy ANAPHYLAXIS and SWELLING    Metformin OTHER (SEE COMMENTS)     Memory problems      Current Outpatient Medications   Medication Sig Dispense Refill    ergocalciferol 1.25 MG (35789 UT) Oral Cap Take 1 capsule (50,000 Units total) by mouth once a week. 24 capsule 0    atorvastatin 10 MG Oral Tab Take 1 tablet (10 mg total) by mouth nightly. (Patient not taking: Reported on 3/18/2024) 90 tablet 3    CONTOUR NEXT TEST In Vitro Strip 1 each 4 (four) times daily.      Ibuprofen 200 MG Oral Cap Take 1 capsule (200 mg total) by mouth.      OLANZapine 5 MG Oral Tab       Tirzepatide (MOUNJARO) 2.5 MG/0.5ML Subcutaneous Solution Pen-injector Inject 2.5 mg into the skin once a week. 2 mL 1    Tirzepatide (MOUNJARO) 5 MG/0.5ML Subcutaneous Solution Pen-injector Inject 5 mg into the skin once a week. 2 mL 1    ergocalciferol 1.25 MG (02842 UT) Oral Cap       ergocalciferol 1.25 MG (48368 UT) Oral Cap Take 1 capsule (50,000 Units total) by mouth once a week.      metFORMIN  MG Oral Tablet 24 Hr       Insulin Pen Needle 32G X 4 MM Does not apply Misc May substitute if not on insurance formulary 100 each 5          REVIEW OF SYSTEMS   A 10 point review of systems was completed and is negative except as listed in history of present illness      PHYSICAL EXAM   Vitals: /70   Pulse 70   Temp 96.9 °F (36.1 °C)   Resp 20   Ht 160 cm (5' 3\")   Wt 72.6 kg   SpO2 92%   BMI 28.34 kg/m²   /70   Pulse 70   Temp 96.9 °F (36.1 °C)   Resp 20   Ht 160 cm (5' 3\")   Wt 72.6 kg   SpO2 92%   BMI  28.34 kg/m²     General: A&Ox3, NAD  Constitutional: Well developed, well nourished, nontoxic  Head: atraumatic, normocephalic   Eyes: conjuctiva clear, no icterus, PERRL, EOMI, vision grossly normal  Ears: normal external appearance, no drainage  Nose:  Atraumatic, no swelling, no drainage, nares patent  Throat:  Moist pink mucous membranes, airway is patent  Neck:  Soft supple, no masses, no tracheal deviation, no stridor  Chest:  No bruising or abrasions, no tenderness, no deformity  Cardiac:  Regular rate and rhythm, no murmurs rubs or gallops.  Lung:  No distress, no retractions. Clear to auscultation bilaterally, no w/r/r  Abdomen:  Soft, nontender, nondistended, normal BS  Back: No stepoff/deformity  Extremities: FROM all ext, no deformities, intact equal peripheral pulses, no cyanosis or edema  Neuro: No facial droop, no slurred speech, moving all extremities freely, SILT to the bilateral upper and lower extremities  Psych: A&Ox3, normal affect, cooperative, calm  Skin: No rash, no petechiae/purpura, warm, dry      RESULTS  LABS:   Results for orders placed or performed during the hospital encounter of 09/22/24   Urinalysis with Culture Reflex    Specimen: Urine, clean catch   Result Value Ref Range    Urine Color Light-Yellow Yellow    Clarity Urine Clear Clear    Spec Gravity >1.030 (H) 1.005 - 1.030    Glucose Urine >1000 (A) Normal mg/dL    Bilirubin Urine Negative Negative    Ketones Urine 20 (A) Negative mg/dL    Blood Urine 2+ (A) Negative    pH Urine 5.5 5.0 - 8.0    Protein Urine 30 (A) Negative mg/dL    Urobilinogen Urine Normal Normal    Nitrite Urine Negative Negative    Leukocyte Esterase Urine Negative Negative    WBC Urine 1-5 0 - 5 /HPF    RBC Urine 3-5 (A) 0 - 2 /HPF    Bacteria Urine None Seen None Seen /HPF    Squamous Epi. Cells Few (A) None Seen /HPF    Renal Tubular Epithelial Cells None Seen None Seen /HPF    Transitional Cells None Seen None Seen /HPF    Yeast Urine None Seen None  Seen /HPF   CBC With Differential With Platelet   Result Value Ref Range    WBC 5.9 4.0 - 11.0 x10(3) uL    RBC 4.53 3.80 - 5.30 x10(6)uL    HGB 14.7 12.0 - 16.0 g/dL    HCT 42.3 35.0 - 48.0 %    MCV 93.4 80.0 - 100.0 fL    MCH 32.5 26.0 - 34.0 pg    MCHC 34.8 31.0 - 37.0 g/dL    RDW-SD 60.6 (H) 35.1 - 46.3 fL    RDW 20.6 (H) 11.0 - 15.0 %    .0 150.0 - 450.0 10(3)uL    Neutrophil Absolute Prelim 3.70 1.50 - 7.70 x10 (3) uL    Neutrophil Absolute 3.70 1.50 - 7.70 x10(3) uL    Lymphocyte Absolute 1.79 1.00 - 4.00 x10(3) uL    Monocyte Absolute 0.31 0.10 - 1.00 x10(3) uL    Eosinophil Absolute 0.05 0.00 - 0.70 x10(3) uL    Basophil Absolute 0.03 0.00 - 0.20 x10(3) uL    Immature Granulocyte Absolute 0.02 0.00 - 1.00 x10(3) uL    Neutrophil % 62.8 %    Lymphocyte % 30.3 %    Monocyte % 5.3 %    Eosinophil % 0.8 %    Basophil % 0.5 %    Immature Granulocyte % 0.3 %   Comp Metabolic Panel (14)   Result Value Ref Range    Glucose 256 (H) 70 - 99 mg/dL    Sodium 137 136 - 145 mmol/L    Potassium      Chloride 109 98 - 112 mmol/L    CO2 17.0 (L) 21.0 - 32.0 mmol/L    Anion Gap 11 0 - 18 mmol/L    BUN      Creatinine 0.68 0.55 - 1.02 mg/dL    BUN/CREA Ratio      Calcium, Total 9.6 8.7 - 10.4 mg/dL    eGFR-Cr 102 >=60 mL/min/1.73m2    ALT      AST      Alkaline Phosphatase 58 46 - 118 U/L    Bilirubin, Total 0.4 0.3 - 1.2 mg/dL    Total Protein 7.3 5.7 - 8.2 g/dL    Albumin      Globulin       A/G Ratio     RBC Morphology Scan   Result Value Ref Range    RBC Morphology See morphology below (A) Normal, Slide reviewed, see previous RBC morphology.    Platelet Morphology Normal Normal    Macrocytosis 1+      Microcytosis 1+      Tear Drop Cells 1+     Scan slide   Result Value Ref Range    Slide Review       Platelets reviewed on smear. No giant platelets or platelet clumps observed.   REDRAW CMP (P)   Result Value Ref Range    Potassium 5.1 3.5 - 5.1 mmol/L    AST 53 (H) <34 U/L    ALT 46 10 - 49 U/L    BUN 14 9 - 23  mg/dL   Albumin/ Globulin Ratio (A/G)   Result Value Ref Range    Albumin 4.4 3.2 - 4.8 g/dL    Total Protein 6.5 5.7 - 8.2 g/dL    Globulin  2.1 2.0 - 3.5 g/dL    A/G Ratio 2.1 (H) 1.0 - 2.0   POCT Glucose   Result Value Ref Range    POC Glucose  282 (H) 70 - 99 mg/dL   RAINBOW DRAW LAVENDER   Result Value Ref Range    Hold Lavender Auto Resulted    RAINBOW DRAW LIGHT GREEN   Result Value Ref Range    Hold Lt Green Auto Resulted    RAINBOW DRAW BLUE   Result Value Ref Range    Hold Blue Auto Resulted    RAINBOW DRAW GOLD   Result Value Ref Range    Hold Gold Auto Resulted          IMAGING: No results found.        Procedures:   Procedures       ED COURSE          Re-Evaluation: Improved      Disposition & Plan:   Clinical Impression/Final Diagnosis:   1. Hyperglycemia        Medical Decision Making: The patient has lab work that shows some hyperglycemia, with some ketonuria and low bicarb.  She does have a normal anion gap elevation and this is not consistent with a DKA picture.  She also has no polyuria polydipsia.  sHe is a bit dehydrated and may have something to do with her not taking much by mouth lately in effort to control her blood sugar.  I discussed with her her lab findings, urinalysis which shows some scant hematuria which is nonspecific, though without symptoms I do not feel that this is evolving urinary tract infection.  She has no fever or tachycardia.  I do feel her elevated blood sugar is more likely related to her not taking her metformin.  I treated her dehydration with IV fluids and p.o. fluid bolus and she reports she is feeling better but she is also frustrated that her blood sugar is not better.  She unfortunately did have an issue where her blood specimen hemolyzed 3 times and needed repeated redraw leading to an extended ER visit.  I did do attempt at service recovery however she was a bit frustrated by her ER visit being longer than she anticipated standing and agreement with this plan and  assures me she will follow-up with endocrinology in the next day or 2 as well as her primary doctor and continue to monitor her blood sugar at home and resumes her metformin    Medical Decision Making  Amount and/or Complexity of Data Reviewed  External Data Reviewed: notes.  Labs: ordered. Decision-making details documented in ED Course.    Risk  OTC drugs.  Prescription drug management.  Decision regarding hospitalization.        Disposition: Discharge  There are no disposition comments on file for this visit.     This note was generated in part using voice recognition dictation technology. The report was reviewed by this physician but still may have unintentional errors due to inherent limitations of voice recognition technology. All times are estimates.

## 2024-09-30 ENCOUNTER — LAB ENCOUNTER (OUTPATIENT)
Dept: LAB | Age: 54
End: 2024-09-30
Attending: STUDENT IN AN ORGANIZED HEALTH CARE EDUCATION/TRAINING PROGRAM
Payer: COMMERCIAL

## 2024-09-30 ENCOUNTER — OFFICE VISIT (OUTPATIENT)
Dept: FAMILY MEDICINE CLINIC | Facility: CLINIC | Age: 54
End: 2024-09-30
Payer: COMMERCIAL

## 2024-09-30 VITALS
WEIGHT: 158 LBS | DIASTOLIC BLOOD PRESSURE: 67 MMHG | SYSTOLIC BLOOD PRESSURE: 110 MMHG | HEART RATE: 74 BPM | BODY MASS INDEX: 28 KG/M2 | HEIGHT: 63 IN

## 2024-09-30 DIAGNOSIS — E11.65 UNCONTROLLED TYPE 2 DIABETES MELLITUS WITH HYPERGLYCEMIA (HCC): ICD-10-CM

## 2024-09-30 DIAGNOSIS — E11.65 UNCONTROLLED TYPE 2 DIABETES MELLITUS WITH HYPERGLYCEMIA (HCC): Primary | ICD-10-CM

## 2024-09-30 DIAGNOSIS — R45.89 ANXIETY ABOUT HEALTH: ICD-10-CM

## 2024-09-30 DIAGNOSIS — R53.83 OTHER FATIGUE: ICD-10-CM

## 2024-09-30 DIAGNOSIS — E55.9 VITAMIN D DEFICIENCY: ICD-10-CM

## 2024-09-30 DIAGNOSIS — E78.5 HYPERLIPIDEMIA, UNSPECIFIED HYPERLIPIDEMIA TYPE: ICD-10-CM

## 2024-09-30 LAB
ALBUMIN SERPL-MCNC: 4.5 G/DL (ref 3.2–4.8)
ALBUMIN/GLOB SERPL: 1.9 {RATIO} (ref 1–2)
ALP LIVER SERPL-CCNC: 76 U/L
ALT SERPL-CCNC: 57 U/L
ANION GAP SERPL CALC-SCNC: 15 MMOL/L (ref 0–18)
AST SERPL-CCNC: 32 U/L (ref ?–34)
BILIRUB SERPL-MCNC: 0.3 MG/DL (ref 0.3–1.2)
BUN BLD-MCNC: 16 MG/DL (ref 9–23)
BUN/CREAT SERPL: 20.3 (ref 10–20)
CALCIUM BLD-MCNC: 10 MG/DL (ref 8.7–10.4)
CHLORIDE SERPL-SCNC: 100 MMOL/L (ref 98–112)
CHOLEST SERPL-MCNC: 680 MG/DL (ref ?–200)
CO2 SERPL-SCNC: 15 MMOL/L (ref 21–32)
CREAT BLD-MCNC: 0.79 MG/DL
EGFRCR SERPLBLD CKD-EPI 2021: 88 ML/MIN/1.73M2 (ref 60–?)
EST. AVERAGE GLUCOSE BLD GHB EST-MCNC: 303 MG/DL (ref 68–126)
FASTING PATIENT LIPID ANSWER: NO
FASTING STATUS PATIENT QL REPORTED: NO
GLOBULIN PLAS-MCNC: 2.4 G/DL (ref 2–3.5)
GLUCOSE BLD-MCNC: 317 MG/DL (ref 70–99)
HBA1C MFR BLD: 12.2 % (ref ?–5.7)
HDLC SERPL-MCNC: 29 MG/DL (ref 40–59)
IRON SATN MFR SERPL: 47 %
IRON SERPL-MCNC: 77 UG/DL
LDLC SERPL DIRECT ASSAY-MCNC: 44 MG/DL (ref ?–100)
NONHDLC SERPL-MCNC: 651 MG/DL (ref ?–130)
OSMOLALITY SERPL CALC.SUM OF ELEC: 283 MOSM/KG (ref 275–295)
POTASSIUM SERPL-SCNC: 4 MMOL/L (ref 3.5–5.1)
PROT SERPL-MCNC: 6.9 G/DL (ref 5.7–8.2)
SODIUM SERPL-SCNC: 130 MMOL/L (ref 136–145)
TIBC SERPL-MCNC: 164 UG/DL (ref 250–425)
TRANSFERRIN SERPL-MCNC: 110 MG/DL (ref 250–380)
TRIGL SERPL-MCNC: 3948 MG/DL (ref 30–149)
VIT D+METAB SERPL-MCNC: 6.3 NG/ML (ref 30–100)

## 2024-09-30 PROCEDURE — 3074F SYST BP LT 130 MM HG: CPT | Performed by: STUDENT IN AN ORGANIZED HEALTH CARE EDUCATION/TRAINING PROGRAM

## 2024-09-30 PROCEDURE — 99214 OFFICE O/P EST MOD 30 MIN: CPT | Performed by: STUDENT IN AN ORGANIZED HEALTH CARE EDUCATION/TRAINING PROGRAM

## 2024-09-30 PROCEDURE — 3046F HEMOGLOBIN A1C LEVEL >9.0%: CPT | Performed by: STUDENT IN AN ORGANIZED HEALTH CARE EDUCATION/TRAINING PROGRAM

## 2024-09-30 PROCEDURE — 3078F DIAST BP <80 MM HG: CPT | Performed by: STUDENT IN AN ORGANIZED HEALTH CARE EDUCATION/TRAINING PROGRAM

## 2024-09-30 PROCEDURE — 80061 LIPID PANEL: CPT

## 2024-09-30 PROCEDURE — 84466 ASSAY OF TRANSFERRIN: CPT

## 2024-09-30 PROCEDURE — 36415 COLL VENOUS BLD VENIPUNCTURE: CPT

## 2024-09-30 PROCEDURE — G2211 COMPLEX E/M VISIT ADD ON: HCPCS | Performed by: STUDENT IN AN ORGANIZED HEALTH CARE EDUCATION/TRAINING PROGRAM

## 2024-09-30 PROCEDURE — 83036 HEMOGLOBIN GLYCOSYLATED A1C: CPT

## 2024-09-30 PROCEDURE — 82306 VITAMIN D 25 HYDROXY: CPT

## 2024-09-30 PROCEDURE — 3008F BODY MASS INDEX DOCD: CPT | Performed by: STUDENT IN AN ORGANIZED HEALTH CARE EDUCATION/TRAINING PROGRAM

## 2024-09-30 PROCEDURE — 83721 ASSAY OF BLOOD LIPOPROTEIN: CPT

## 2024-09-30 PROCEDURE — 80053 COMPREHEN METABOLIC PANEL: CPT

## 2024-09-30 PROCEDURE — 83540 ASSAY OF IRON: CPT

## 2024-09-30 RX ORDER — GLIMEPIRIDE 4 MG/1
4 TABLET ORAL
COMMUNITY
Start: 2024-04-24 | End: 2024-09-30

## 2024-09-30 RX ORDER — METFORMIN HCL 500 MG
1000 TABLET, EXTENDED RELEASE 24 HR ORAL 2 TIMES DAILY WITH MEALS
Qty: 360 TABLET | Refills: 3 | Status: SHIPPED | OUTPATIENT
Start: 2024-09-30 | End: 2024-12-29

## 2024-09-30 RX ORDER — ATORVASTATIN CALCIUM 20 MG/1
20 TABLET, FILM COATED ORAL NIGHTLY
Qty: 90 TABLET | Refills: 3 | Status: SHIPPED | OUTPATIENT
Start: 2024-09-30

## 2024-09-30 RX ORDER — GLIMEPIRIDE 4 MG/1
4 TABLET ORAL
Qty: 180 TABLET | Refills: 3 | Status: SHIPPED | OUTPATIENT
Start: 2024-09-30

## 2024-09-30 NOTE — PROGRESS NOTES
HPI:    Patient ID: Aziza De La Torre is a 54 year old female.    HPI  Pt presenting for follow-up. Last seen March 2024.    H/o T2DM, followed by multiple Endos  Feels that medications are not helping due to elevated A1c  Romel Endo 10.5  Res Endo 11.9  Currently taking Glimepiride, Metformin  States prior Mounjaro did not help, although did not follow-up for labs  Had previously been on insulin (Humalog) but discontinued due to concern for side effects    Reports increased fatigue, sleepiness, weakness  Not feeling well  Endorses intermittent ice cream intake  Protein intake helps lower blood sugar per report    Has concerns about external factor affecting blood sugar levels  Last normal A1c Oct 2019 -- 5.6      Review of Systems   A comprehensive 10 point review of systems was completed.  Pertinent positives and negatives noted in the the HPI.       Current Outpatient Medications   Medication Sig Dispense Refill    empagliflozin 10 MG Oral Tab Take 1 tablet (10 mg total) by mouth daily. 90 tablet 0    metFORMIN  MG Oral Tablet 24 Hr Take 2 tablets (1,000 mg total) by mouth 2 (two) times daily with meals. 360 tablet 3    glimepiride 4 MG Oral Tab Take 1 tablet (4 mg total) by mouth 2 (two) times daily before meals. 180 tablet 3    atorvastatin 20 MG Oral Tab Take 1 tablet (20 mg total) by mouth nightly. 90 tablet 3    ergocalciferol 1.25 MG (81592 UT) Oral Cap Take 1 capsule (50,000 Units total) by mouth once a week. 24 capsule 0    CONTOUR NEXT TEST In Vitro Strip 1 each 4 (four) times daily.      Ibuprofen 200 MG Oral Cap Take 1 capsule (200 mg total) by mouth.      Insulin Pen Needle 32G X 4 MM Does not apply Misc May substitute if not on insurance formulary 100 each 5    atorvastatin 10 MG Oral Tab Take 1 tablet (10 mg total) by mouth nightly. (Patient not taking: Reported on 3/18/2024) 90 tablet 3    OLANZapine 5 MG Oral Tab  (Patient not taking: Reported on 9/30/2024)       Allergies:  Allergies   Allergen  Reactions    Fish-Derived Products SWELLING    Penicillins SHORTNESS OF BREATH and SWELLING    Shellfish Allergy ANAPHYLAXIS and SWELLING    Metformin OTHER (SEE COMMENTS)     Memory problems      Vitals:    09/30/24 1254   BP: 110/67   Pulse: 74   Weight: 158 lb (71.7 kg)   Height: 5' 3\" (1.6 m)       Body mass index is 27.99 kg/m².   PHYSICAL EXAM:   Physical Exam  Vitals reviewed.   Constitutional:       General: She is not in acute distress.  HENT:      Head: Normocephalic.   Eyes:      Conjunctiva/sclera: Conjunctivae normal.   Cardiovascular:      Rate and Rhythm: Normal rate and regular rhythm.      Pulses: Normal pulses.   Pulmonary:      Effort: Pulmonary effort is normal. No respiratory distress.   Musculoskeletal:      Right lower leg: No edema.      Left lower leg: No edema.   Neurological:      General: No focal deficit present.      Mental Status: She is alert.   Psychiatric:         Mood and Affect: Mood is anxious.         Speech: Speech is tangential.             ASSESSMENT/PLAN:   1. Uncontrolled type 2 diabetes mellitus with hyperglycemia (HCC)  - discussed home blood glucose monitoring  - counseled on healthy diet and lifestyle changes for overall wellness, which includes decreased carb and sugar intake, increased fiber intake, and increased water intake as tolerated  - discussed exercise as able  - provided with diabetic diet information  - continue Metformin, Glipizide -- will start Jardiance  - will start statin daily  - empagliflozin 10 MG Oral Tab; Take 1 tablet (10 mg total) by mouth daily.  Dispense: 90 tablet; Refill: 0  - Integrative Medicine Physician Consultation ([place]) - Internal Referral  - Lipid Panel; Future  - Comp Metabolic Panel (14) [E]; Future  - Hemoglobin A1C; Future  - metFORMIN  MG Oral Tablet 24 Hr; Take 2 tablets (1,000 mg total) by mouth 2 (two) times daily with meals.  Dispense: 360 tablet; Refill: 3  - glimepiride 4 MG Oral Tab; Take 1 tablet (4 mg total) by  mouth 2 (two) times daily before meals.  Dispense: 180 tablet; Refill: 3  - [facility] Referral to Chronic Care Management (CCM)    2. Vitamin D deficiency  - Vitamin D [E]; Future    3. Other fatigue  Discussed DDx including hyperglycemia, stress, metabolic  Will check labs  - increase hydration and rest as tolerated  - emphasized importance of glycemic control  - discussed red flags for urgent reevaluation  - to call with any questions/concerns  Consider Integrative medicine per request  - Iron And Tibc [E]; Future    4. Hyperlipidemia, unspecified hyperlipidemia type  Restart statin dosing  - atorvastatin 20 MG Oral Tab; Take 1 tablet (20 mg total) by mouth nightly.  Dispense: 90 tablet; Refill: 3  - [facility] Referral to Chronic Care Management (CCM)    5. Anxiety about health  Will enroll in CCM for additional support  To follow-up with Psych  - EE Referral to Chronic Care Management (CCM)    Pt verbalized understanding and agrees with plan.    Orders Placed This Encounter   Procedures    Lipid Panel    Comp Metabolic Panel (14) [E]    Hemoglobin A1C    Iron And Tibc [E]    Vitamin D [E]       Meds This Visit:  Requested Prescriptions     Signed Prescriptions Disp Refills    empagliflozin 10 MG Oral Tab 90 tablet 0     Sig: Take 1 tablet (10 mg total) by mouth daily.    metFORMIN  MG Oral Tablet 24 Hr 360 tablet 3     Sig: Take 2 tablets (1,000 mg total) by mouth 2 (two) times daily with meals.    glimepiride 4 MG Oral Tab 180 tablet 3     Sig: Take 1 tablet (4 mg total) by mouth 2 (two) times daily before meals.    atorvastatin 20 MG Oral Tab 90 tablet 3     Sig: Take 1 tablet (20 mg total) by mouth nightly.       Imaging & Referrals:  INTEGRATIVE MEDICINE PHYSICIAN CONSULTATION ([place]) - INTERNAL REF  [facility] REFER TO CHRONIC CARE [facility]         ID#[id number]

## 2024-10-01 ENCOUNTER — TELEPHONE (OUTPATIENT)
Dept: FAMILY MEDICINE CLINIC | Facility: CLINIC | Age: 54
End: 2024-10-01

## 2024-10-01 NOTE — TELEPHONE ENCOUNTER
Patient called, verified Name and . Needs clarification on medications (diabetic medications and statin) ordered. Reviewed Dr. Moore's message below. Patient verbalized understanding and had no further questions at this time.      Cintia Moore MD  2024  8:43 PM CDT Back to Top      Please make sure that pt understands to start cholesterol medication to reduce cardiac risk.      Your cholesterol numbers are SIGNIFICANTLY elevated, which increase your risk for heart attack and stroke. I highly recommend restarting atorvastatin dosing -- I will resend to your pharmacy.  Salt level is low, which could be due to recent increased water intake. We will continue to monitor.  NO SIGNS OF HEMOLYSIS.  Your vitamin D level is low, indicating a Vitamin D deficiency. Let's start a weekly vitamin D supplement for the next 24 weeks. Iron levels are also low.  A1c is elevated compared to previous. I will like to connect you with a chronic care nurse who can contact you monthly to monitor your blood sugar levels and make sure that you're tolerating your medications.  Please contact us with any questions.   Written by Cintia Moore MD on 2024  8:43 PM CDT  Seen by patient Aziza De La Torre on 10/1/2024  4:10 AM     Future Appointments   Date Time Provider Department Center   2024  3:30 PM Evangelina Bee L. [facility] INT MED [facility]   2024  1:00 PM Cintia Moore MD [facility]

## 2024-11-26 ENCOUNTER — TELEPHONE (OUTPATIENT)
Dept: FAMILY MEDICINE CLINIC | Facility: CLINIC | Age: 54
End: 2024-11-26

## (undated) NOTE — LETTER
Patient Name: Randy Welch  : 1970  MRN: [id number]  Patient Address: [address] [place] [address].  [address] [place], [place] [id number]      Coronavirus Disease 2019 (COVID-19)     [facility] is committed to the safety and well-being of our patients, members, employees, and c symptoms get worse, call your healthcare provider immediately. 3. Get rest and stay hydrated.    4. If you have a medical appointment, call the healthcare provider ahead of time and tell them that you have or may have COVID-19.  5. For medical emergencies, medications; and  · Improvement in respiratory symptoms (e.g., cough, shortness of breath); and  · At least 10 days have passed since symptoms first appeared OR if asymptomatic patient or date of symptom onset is unclear then use 10 days post COVID test da Have had a confirmed diagnosis of COVID-19  · Be symptom-free for at least 14 days*    *Some people will be required to have a repeat COVID-19 test in order to be eligible to donate.  If you’re instructed by Maximiliano Taylor that a repeat test is required, please co way to prevent the long-term symptoms of COVID-19 is by getting the COVID 19 vaccine as soon as it is available to you, wear a mask in public, avoid large gatherings, wash your hands frequently, and stay at least 6 feet away from other people.     Reference

## (undated) NOTE — LETTER
11/26/2024    Aziza De La Torre  [address] [facility] [address]    Dear Aziza,    We would like to inform you that your account has been charged $40 for not showing up to the office for your scheduled appointment on 11/06/2024.    Our no-show policy is as follows: A 24-hour notice is required, or you may be charged a $40 No Show fee.      If you are unable to keep your scheduled appointment, please notify us at least 24 hours in advance so we can accommodate our other patients. You may also reschedule your appointment at that time.    On the third no-show, within a 12-month period, it will be the physician’s discretion as to whether a discharge letter will be sent out disengaging you from the practice and giving you 30 days to enroll with a new non [facility] physician.    If you would like to contest this charge, please call [phone].    Sincerely,  [facility]

## (undated) NOTE — LETTER
01/13/22        Sachin Luo  [address]  [place]      Dear Mona [facility] records indicate that you have outstanding lab work and or testing that was ordered for you and has not yet been completed:  TSH W REFLEX TO FREE T4 (Order #[id number]) on 9